# Patient Record
Sex: FEMALE | Race: BLACK OR AFRICAN AMERICAN | NOT HISPANIC OR LATINO | Employment: OTHER | URBAN - METROPOLITAN AREA
[De-identification: names, ages, dates, MRNs, and addresses within clinical notes are randomized per-mention and may not be internally consistent; named-entity substitution may affect disease eponyms.]

---

## 2022-06-24 PROCEDURE — 96360 HYDRATION IV INFUSION INIT: CPT

## 2022-06-24 PROCEDURE — 99283 EMERGENCY DEPT VISIT LOW MDM: CPT | Mod: 25

## 2022-06-24 PROCEDURE — 96361 HYDRATE IV INFUSION ADD-ON: CPT

## 2022-06-25 ENCOUNTER — HOSPITAL ENCOUNTER (EMERGENCY)
Facility: CLINIC | Age: 32
Discharge: HOME | End: 2022-06-25
Attending: EMERGENCY MEDICINE | Admitting: EMERGENCY MEDICINE
Payer: COMMERCIAL

## 2022-06-25 VITALS
TEMPERATURE: 98.4 F | SYSTOLIC BLOOD PRESSURE: 98 MMHG | DIASTOLIC BLOOD PRESSURE: 63 MMHG | OXYGEN SATURATION: 100 % | RESPIRATION RATE: 16 BRPM | HEART RATE: 78 BPM

## 2022-06-25 DIAGNOSIS — R11.2 NON-INTRACTABLE VOMITING WITH NAUSEA, UNSPECIFIED VOMITING TYPE: ICD-10-CM

## 2022-06-25 LAB
ALBUMIN SERPL-MCNC: 3.3 G/DL (ref 3.4–5)
ALBUMIN UR-MCNC: NEGATIVE MG/DL
ALP SERPL-CCNC: 46 U/L (ref 40–150)
ALT SERPL W P-5'-P-CCNC: 20 U/L (ref 0–50)
ANION GAP SERPL CALCULATED.3IONS-SCNC: 6 MMOL/L (ref 3–14)
APPEARANCE UR: ABNORMAL
AST SERPL W P-5'-P-CCNC: 10 U/L (ref 0–45)
BACTERIA #/AREA URNS HPF: ABNORMAL /HPF
BASOPHILS # BLD AUTO: 0 10E3/UL (ref 0–0.2)
BASOPHILS NFR BLD AUTO: 0 %
BILIRUB DIRECT SERPL-MCNC: 0.1 MG/DL (ref 0–0.2)
BILIRUB SERPL-MCNC: 0.4 MG/DL (ref 0.2–1.3)
BILIRUB UR QL STRIP: NEGATIVE
BUN SERPL-MCNC: 16 MG/DL (ref 7–30)
CALCIUM SERPL-MCNC: 8.8 MG/DL (ref 8.5–10.1)
CHLORIDE BLD-SCNC: 109 MMOL/L (ref 94–109)
CO2 SERPL-SCNC: 21 MMOL/L (ref 20–32)
COLOR UR AUTO: ABNORMAL
CREAT SERPL-MCNC: 0.36 MG/DL (ref 0.52–1.04)
EOSINOPHIL # BLD AUTO: 0.1 10E3/UL (ref 0–0.7)
EOSINOPHIL NFR BLD AUTO: 2 %
ERYTHROCYTE [DISTWIDTH] IN BLOOD BY AUTOMATED COUNT: 12.2 % (ref 10–15)
GFR SERPL CREATININE-BSD FRML MDRD: >90 ML/MIN/1.73M2
GLUCOSE BLD-MCNC: 97 MG/DL (ref 70–99)
GLUCOSE UR STRIP-MCNC: NEGATIVE MG/DL
HCG SERPL QL: NEGATIVE
HCT VFR BLD AUTO: 31.8 % (ref 35–47)
HGB BLD-MCNC: 10.9 G/DL (ref 11.7–15.7)
HGB UR QL STRIP: NEGATIVE
IMM GRANULOCYTES # BLD: 0 10E3/UL
IMM GRANULOCYTES NFR BLD: 0 %
KETONES UR STRIP-MCNC: NEGATIVE MG/DL
LEUKOCYTE ESTERASE UR QL STRIP: NEGATIVE
LIPASE SERPL-CCNC: 118 U/L (ref 73–393)
LYMPHOCYTES # BLD AUTO: 2 10E3/UL (ref 0.8–5.3)
LYMPHOCYTES NFR BLD AUTO: 37 %
MCH RBC QN AUTO: 29.1 PG (ref 26.5–33)
MCHC RBC AUTO-ENTMCNC: 34.3 G/DL (ref 31.5–36.5)
MCV RBC AUTO: 85 FL (ref 78–100)
MONOCYTES # BLD AUTO: 0.5 10E3/UL (ref 0–1.3)
MONOCYTES NFR BLD AUTO: 9 %
MUCOUS THREADS #/AREA URNS LPF: PRESENT /LPF
NEUTROPHILS # BLD AUTO: 2.8 10E3/UL (ref 1.6–8.3)
NEUTROPHILS NFR BLD AUTO: 52 %
NITRATE UR QL: NEGATIVE
NRBC # BLD AUTO: 0 10E3/UL
NRBC BLD AUTO-RTO: 0 /100
PH UR STRIP: 5.5 [PH] (ref 5–7)
PLATELET # BLD AUTO: 182 10E3/UL (ref 150–450)
POTASSIUM BLD-SCNC: 3.4 MMOL/L (ref 3.4–5.3)
PROT SERPL-MCNC: 7.1 G/DL (ref 6.8–8.8)
RBC # BLD AUTO: 3.75 10E6/UL (ref 3.8–5.2)
RBC URINE: <1 /HPF
SODIUM SERPL-SCNC: 136 MMOL/L (ref 133–144)
SP GR UR STRIP: 1.02 (ref 1–1.03)
SQUAMOUS EPITHELIAL: 13 /HPF
UROBILINOGEN UR STRIP-MCNC: NORMAL MG/DL
WBC # BLD AUTO: 5.5 10E3/UL (ref 4–11)
WBC URINE: 2 /HPF

## 2022-06-25 PROCEDURE — 84703 CHORIONIC GONADOTROPIN ASSAY: CPT | Performed by: EMERGENCY MEDICINE

## 2022-06-25 PROCEDURE — 82947 ASSAY GLUCOSE BLOOD QUANT: CPT | Performed by: EMERGENCY MEDICINE

## 2022-06-25 PROCEDURE — 82248 BILIRUBIN DIRECT: CPT | Performed by: EMERGENCY MEDICINE

## 2022-06-25 PROCEDURE — 258N000003 HC RX IP 258 OP 636: Performed by: EMERGENCY MEDICINE

## 2022-06-25 PROCEDURE — 36415 COLL VENOUS BLD VENIPUNCTURE: CPT | Performed by: EMERGENCY MEDICINE

## 2022-06-25 PROCEDURE — 83690 ASSAY OF LIPASE: CPT | Performed by: EMERGENCY MEDICINE

## 2022-06-25 PROCEDURE — 81003 URINALYSIS AUTO W/O SCOPE: CPT | Performed by: EMERGENCY MEDICINE

## 2022-06-25 PROCEDURE — 85014 HEMATOCRIT: CPT | Performed by: EMERGENCY MEDICINE

## 2022-06-25 PROCEDURE — 84450 TRANSFERASE (AST) (SGOT): CPT | Performed by: EMERGENCY MEDICINE

## 2022-06-25 RX ORDER — ONDANSETRON 2 MG/ML
4 INJECTION INTRAMUSCULAR; INTRAVENOUS ONCE
Status: DISCONTINUED | OUTPATIENT
Start: 2022-06-25 | End: 2022-06-25 | Stop reason: HOSPADM

## 2022-06-25 RX ORDER — ACETAMINOPHEN 500 MG
1000 TABLET ORAL ONCE
Status: DISCONTINUED | OUTPATIENT
Start: 2022-06-25 | End: 2022-06-25 | Stop reason: HOSPADM

## 2022-06-25 RX ORDER — ONDANSETRON 4 MG/1
4 TABLET, ORALLY DISINTEGRATING ORAL EVERY 8 HOURS PRN
Qty: 10 TABLET | Refills: 0 | Status: SHIPPED | OUTPATIENT
Start: 2022-06-25 | End: 2022-06-28

## 2022-06-25 RX ADMIN — SODIUM CHLORIDE 1000 ML: 9 INJECTION, SOLUTION INTRAVENOUS at 00:01

## 2022-06-25 ASSESSMENT — ENCOUNTER SYMPTOMS
NAUSEA: 1
VOMITING: 1

## 2022-06-25 NOTE — ED PROVIDER NOTES
History   Chief Complaint:  Nausea & Vomiting       HPI   Anne Alonso is a 32 year old female otherwise healthy who presents with her son for evaluation of vomiting. The patient reports that she has had nausea for the last 2 weeks. She states that she has been vomiting for the last 2 days leading to ED visit. She has been drinking fluids. She does not have a primary.    Review of Systems   Constitutional: Negative for fever.   Cardiovascular: Negative for chest pain.   Gastrointestinal: Positive for nausea and vomiting. Negative for abdominal pain and diarrhea.   All other systems reviewed and are negative.      Allergies:  The patient has no known allergies.     Medications:  The patient is currently on no regular medications.    Past Medical History:     The patient denies past medical history.     Social History:  The patient presents to the ED with her son    Physical Exam     Patient Vitals for the past 24 hrs:   BP Temp Temp src Pulse Resp SpO2   06/24/22 2336 95/62 98.4  F (36.9  C) Temporal 80 16 99 %       Physical Exam  General: Appears well-developed and well-nourished.   Head: No signs of trauma.   CV: Normal rate and regular rhythm.    Resp: Effort normal and breath sounds normal. No respiratory distress.   GI: Soft. There is no tenderness.  No rebound or guarding.  Normal bowel sounds.  No CVA tenderness.  MSK: Normal range of motion.  Neuro: The patient is alert and oriented. Speech normal.  Skin: Skin is warm and dry. No rash noted.   Psych: normal mood and affect. behavior is normal.       Emergency Department Course   Laboratory:  Labs Ordered and Resulted from Time of ED Arrival to Time of ED Departure   BASIC METABOLIC PANEL - Abnormal       Result Value    Sodium 136      Potassium 3.4      Chloride 109      Carbon Dioxide (CO2) 21      Anion Gap 6      Urea Nitrogen 16      Creatinine 0.36 (*)     Calcium 8.8      Glucose 97      GFR Estimate >90     HEPATIC FUNCTION PANEL - Abnormal     Bilirubin Total 0.4      Bilirubin Direct 0.1      Protein Total 7.1      Albumin 3.3 (*)     Alkaline Phosphatase 46      AST 10      ALT 20     ROUTINE UA WITH MICROSCOPIC REFLEX TO CULTURE - Abnormal    Color Urine Light Yellow      Appearance Urine Slightly Cloudy (*)     Glucose Urine Negative      Bilirubin Urine Negative      Ketones Urine Negative      Specific Gravity Urine 1.019      Blood Urine Negative      pH Urine 5.5      Protein Albumin Urine Negative      Urobilinogen Urine Normal      Nitrite Urine Negative      Leukocyte Esterase Urine Negative      Bacteria Urine Few (*)     Mucus Urine Present (*)     RBC Urine <1      WBC Urine 2      Squamous Epithelials Urine 13 (*)    CBC WITH PLATELETS AND DIFFERENTIAL - Abnormal    WBC Count 5.5      RBC Count 3.75 (*)     Hemoglobin 10.9 (*)     Hematocrit 31.8 (*)     MCV 85      MCH 29.1      MCHC 34.3      RDW 12.2      Platelet Count 182      % Neutrophils 52      % Lymphocytes 37      % Monocytes 9      % Eosinophils 2      % Basophils 0      % Immature Granulocytes 0      NRBCs per 100 WBC 0      Absolute Neutrophils 2.8      Absolute Lymphocytes 2.0      Absolute Monocytes 0.5      Absolute Eosinophils 0.1      Absolute Basophils 0.0      Absolute Immature Granulocytes 0.0      Absolute NRBCs 0.0     LIPASE - Normal    Lipase 118     HCG QUALITATIVE PREGNANCY - Normal    hCG Serum Qualitative Negative          Emergency Department Course:             Reviewed:  I reviewed nursing notes, vitals and past medical history    Assessments:  0245 I obtained history and examined the patient as noted above. Plan explained at this time.    Interventions:  0001 NS, 1 L, IV  Zofran, 4 mg, IV   Tylenol, 1 g, PO    Disposition:  The patient was discharged to home.     Impression & Plan   Medical Decision Making:  Anne Alonso is a 32-year-old woman presents due to nausea and vomiting.  She presented with her child as well who was also having vomiting and diarrhea  for the last couple of days.  She received IV fluids along with blood work while waiting in triage.  Blood work was reassuring.  Did give a dose of Zofran and given the reassuring work-up I felt that she was appropriate for discharge home with Zofran for continued symptom management.  She was recommended to follow-up with her doctor in clinic    Diagnosis:    ICD-10-CM    1. Non-intractable vomiting with nausea, unspecified vomiting type  R11.2 Primary Care Referral       Discharge Medications:  New Prescriptions    ONDANSETRON (ZOFRAN ODT) 4 MG ODT TAB    Take 1 tablet (4 mg) by mouth every 8 hours as needed for nausea       Scribe Disclosure:  I, Denilson Crain, am serving as a scribe at 2:40 AM on 6/25/2022 to document services personally performed by David Keith MD based on my observations and the provider's statements to me.            David Keith MD  06/26/22 0519

## 2022-06-25 NOTE — ED TRIAGE NOTES
N/v today, and the vomiting is making her fatigued.     Triage Assessment     Row Name 06/24/22 0542       Triage Assessment (Adult)    Airway WDL WDL       Respiratory WDL    Respiratory WDL WDL       Skin Circulation/Temperature WDL    Skin Circulation/Temperature WDL WDL       Cardiac WDL    Cardiac WDL WDL       Peripheral/Neurovascular WDL    Peripheral Neurovascular WDL WDL       Cognitive/Neuro/Behavioral WDL    Cognitive/Neuro/Behavioral WDL WDL

## 2022-06-26 ASSESSMENT — ENCOUNTER SYMPTOMS
ABDOMINAL PAIN: 0
DIARRHEA: 0
FEVER: 0

## 2022-07-13 PROBLEM — N90.810 FEMALE CIRCUMCISION: Status: ACTIVE | Noted: 2019-01-17

## 2022-08-02 LAB
ABO (EXTERNAL): NORMAL
HEPATITIS B SURFACE ANTIGEN (EXTERNAL): NEGATIVE
HIV1+2 AB SERPL QL IA: NONREACTIVE
RH (EXTERNAL): POSITIVE
RUBELLA ANTIBODY IGG (EXTERNAL): NORMAL
TREPONEMA PALLIDUM ANTIBODY (EXTERNAL): NONREACTIVE

## 2023-01-12 LAB — GROUP B STREPTOCOCCUS (EXTERNAL): NEGATIVE

## 2023-01-12 RX ORDER — OXYTOCIN/0.9 % SODIUM CHLORIDE 30/500 ML
340 PLASTIC BAG, INJECTION (ML) INTRAVENOUS CONTINUOUS PRN
Status: CANCELLED | OUTPATIENT
Start: 2023-01-12

## 2023-01-12 RX ORDER — TRANEXAMIC ACID 10 MG/ML
1 INJECTION, SOLUTION INTRAVENOUS EVERY 30 MIN PRN
Status: CANCELLED | OUTPATIENT
Start: 2023-01-12

## 2023-01-12 RX ORDER — CEFAZOLIN SODIUM/WATER 2 G/20 ML
2 SYRINGE (ML) INTRAVENOUS
Status: CANCELLED | OUTPATIENT
Start: 2023-01-12

## 2023-01-12 RX ORDER — MISOPROSTOL 200 UG/1
400 TABLET ORAL
Status: CANCELLED | OUTPATIENT
Start: 2023-01-12

## 2023-01-12 RX ORDER — CITRIC ACID/SODIUM CITRATE 334-500MG
30 SOLUTION, ORAL ORAL
Status: CANCELLED | OUTPATIENT
Start: 2023-01-12

## 2023-01-12 RX ORDER — OXYTOCIN/0.9 % SODIUM CHLORIDE 30/500 ML
100-340 PLASTIC BAG, INJECTION (ML) INTRAVENOUS CONTINUOUS PRN
Status: CANCELLED | OUTPATIENT
Start: 2023-01-12

## 2023-01-12 RX ORDER — ACETAMINOPHEN 325 MG/1
975 TABLET ORAL ONCE
Status: CANCELLED | OUTPATIENT
Start: 2023-01-12 | End: 2023-01-12

## 2023-01-12 RX ORDER — CEFAZOLIN SODIUM/WATER 2 G/20 ML
2 SYRINGE (ML) INTRAVENOUS SEE ADMIN INSTRUCTIONS
Status: CANCELLED | OUTPATIENT
Start: 2023-01-12

## 2023-01-12 RX ORDER — SODIUM CHLORIDE, SODIUM LACTATE, POTASSIUM CHLORIDE, CALCIUM CHLORIDE 600; 310; 30; 20 MG/100ML; MG/100ML; MG/100ML; MG/100ML
INJECTION, SOLUTION INTRAVENOUS CONTINUOUS
Status: CANCELLED | OUTPATIENT
Start: 2023-01-12

## 2023-01-12 RX ORDER — OXYTOCIN 10 [USP'U]/ML
10 INJECTION, SOLUTION INTRAMUSCULAR; INTRAVENOUS
Status: CANCELLED | OUTPATIENT
Start: 2023-01-12

## 2023-01-12 RX ORDER — METHYLERGONOVINE MALEATE 0.2 MG/ML
200 INJECTION INTRAVENOUS
Status: CANCELLED | OUTPATIENT
Start: 2023-01-12

## 2023-01-12 RX ORDER — MISOPROSTOL 200 UG/1
800 TABLET ORAL
Status: CANCELLED | OUTPATIENT
Start: 2023-01-12

## 2023-01-12 RX ORDER — CARBOPROST TROMETHAMINE 250 UG/ML
250 INJECTION, SOLUTION INTRAMUSCULAR
Status: CANCELLED | OUTPATIENT
Start: 2023-01-12

## 2023-01-12 RX ORDER — LIDOCAINE 40 MG/G
CREAM TOPICAL
Status: CANCELLED | OUTPATIENT
Start: 2023-01-12

## 2023-01-26 ENCOUNTER — TRANSFERRED RECORDS (OUTPATIENT)
Dept: HEALTH INFORMATION MANAGEMENT | Facility: CLINIC | Age: 33
End: 2023-01-26

## 2023-02-08 ENCOUNTER — LAB (OUTPATIENT)
Dept: LAB | Facility: CLINIC | Age: 33
End: 2023-02-08
Payer: COMMERCIAL

## 2023-02-08 ENCOUNTER — ANESTHESIA EVENT (OUTPATIENT)
Dept: OBGYN | Facility: CLINIC | Age: 33
End: 2023-02-08
Payer: COMMERCIAL

## 2023-02-08 LAB
ABO/RH(D): NORMAL
ANTIBODY SCREEN: NEGATIVE
HGB BLD-MCNC: 12.9 G/DL (ref 11.7–15.7)
SPECIMEN EXPIRATION DATE: NORMAL

## 2023-02-08 PROCEDURE — 86780 TREPONEMA PALLIDUM: CPT | Performed by: PHYSICIAN ASSISTANT

## 2023-02-08 PROCEDURE — 86850 RBC ANTIBODY SCREEN: CPT | Performed by: PHYSICIAN ASSISTANT

## 2023-02-08 PROCEDURE — 85018 HEMOGLOBIN: CPT | Performed by: PHYSICIAN ASSISTANT

## 2023-02-08 PROCEDURE — 36415 COLL VENOUS BLD VENIPUNCTURE: CPT | Performed by: PHYSICIAN ASSISTANT

## 2023-02-08 PROCEDURE — 86901 BLOOD TYPING SEROLOGIC RH(D): CPT | Performed by: PHYSICIAN ASSISTANT

## 2023-02-08 NOTE — ANESTHESIA PREPROCEDURE EVALUATION
Anesthesia Pre-Procedure Evaluation    Patient: Anne Alonso   MRN: [id number] : 1990        Procedure : Procedure(s):  REPEAT  SECTION          No past medical history on file.   No past surgical history on file.   No Known Allergies   Social History     Tobacco Use     Smoking status: Never     Smokeless tobacco: Not on file   Substance Use Topics     Alcohol use: Never      Wt Readings from Last 1 Encounters:   No data found for Wt        Anesthesia Evaluation   Pt has had prior anesthetic.     No history of anesthetic complications       ROS/MED HX  ENT/Pulmonary:  - neg pulmonary ROS     Neurologic:  - neg neurologic ROS     Cardiovascular:  - neg cardiovascular ROS     METS/Exercise Tolerance:     Hematologic:     (+) anemia,     Musculoskeletal:  - neg musculoskeletal ROS     GI/Hepatic:  - neg GI/hepatic ROS     Renal/Genitourinary:  - neg Renal ROS     Endo:  - neg endo ROS     Psychiatric/Substance Use:  - neg psychiatric ROS     Infectious Disease:  - neg infectious disease ROS     Malignancy:  - neg malignancy ROS     Other: Comment: Previous  times 3     (+) Possibly pregnant, ,         Physical Exam    Airway  airway exam normal      Mallampati: II   TM distance: > 3 FB   Neck ROM: full   Mouth opening: > 3 cm    Respiratory Devices and Support         Dental           Cardiovascular   cardiovascular exam normal       Rhythm and rate: regular and normal     Pulmonary           breath sounds clear to auscultation           OUTSIDE LABS:  CBC:   Lab Results   Component Value Date    WBC 5.5 2022    HGB 10.9 (L) 2022    HCT 31.8 (L) 2022     2022     BMP:   Lab Results   Component Value Date     2022    POTASSIUM 3.4 2022    CHLORIDE 109 2022    CO2 21 2022    BUN 16 2022    CR 0.36 (L) 2022    GLC 97 2022     COAGS: No results found for: PTT, INR, FIBR  POC:   Lab Results   Component Value Date    HCGS  Negative 2022     HEPATIC:   Lab Results   Component Value Date    ALBUMIN 3.3 (L) 2022    PROTTOTAL 7.1 2022    ALT 20 2022    AST 10 2022    ALKPHOS 46 2022    BILITOTAL 0.4 2022     OTHER:   Lab Results   Component Value Date    STANLEY 8.8 2022    LIPASE 118 2022       Anesthesia Plan    ASA Status:  2      Anesthesia Type: Spinal (GETA backup.).              Consents    Anesthesia Plan(s) and associated risks, benefits, and realistic alternatives discussed. Questions answered and patient/representative(s) expressed understanding.    - Discussed:     - Discussed with:  Patient         Postoperative Care    Pain management: intrathecal morphine.   PONV prophylaxis: Ondansetron (or other 5HT-3), Dexamethasone or Solumedrol     Comments:    Other Comments: Patient felt pressure in previous . I explained that pressure is normal, but anything sharp or painful is not. The  will be in the OR to help with communication.            Marcello Barros MD

## 2023-02-09 ENCOUNTER — HOSPITAL ENCOUNTER (INPATIENT)
Facility: CLINIC | Age: 33
LOS: 3 days | Discharge: HOME | End: 2023-02-12
Attending: OBSTETRICS & GYNECOLOGY | Admitting: OBSTETRICS & GYNECOLOGY
Payer: COMMERCIAL

## 2023-02-09 ENCOUNTER — ANESTHESIA (OUTPATIENT)
Dept: OBGYN | Facility: CLINIC | Age: 33
End: 2023-02-09
Payer: COMMERCIAL

## 2023-02-09 DIAGNOSIS — Z98.891 H/O CESAREAN SECTION: ICD-10-CM

## 2023-02-09 DIAGNOSIS — N90.810 FEMALE CIRCUMCISION: Primary | ICD-10-CM

## 2023-02-09 LAB — T PALLIDUM AB SER QL: NONREACTIVE

## 2023-02-09 PROCEDURE — 250N000011 HC RX IP 250 OP 636: Performed by: ANESTHESIOLOGY

## 2023-02-09 PROCEDURE — 370N000017 HC ANESTHESIA TECHNICAL FEE, PER MIN: Performed by: OBSTETRICS & GYNECOLOGY

## 2023-02-09 PROCEDURE — 250N000013 HC RX MED GY IP 250 OP 250 PS 637: Performed by: OBSTETRICS & GYNECOLOGY

## 2023-02-09 PROCEDURE — 999N000016 HC STATISTIC ATTENDANCE AT DELIVERY

## 2023-02-09 PROCEDURE — 360N000076 HC SURGERY LEVEL 3, PER MIN: Performed by: OBSTETRICS & GYNECOLOGY

## 2023-02-09 PROCEDURE — 272N000001 HC OR GENERAL SUPPLY STERILE: Performed by: OBSTETRICS & GYNECOLOGY

## 2023-02-09 PROCEDURE — 250N000011 HC RX IP 250 OP 636: Performed by: OBSTETRICS & GYNECOLOGY

## 2023-02-09 PROCEDURE — 250N000009 HC RX 250: Performed by: NURSE ANESTHETIST, CERTIFIED REGISTERED

## 2023-02-09 PROCEDURE — 120N000012 HC R&B POSTPARTUM

## 2023-02-09 PROCEDURE — 258N000003 HC RX IP 258 OP 636: Performed by: PHYSICIAN ASSISTANT

## 2023-02-09 PROCEDURE — 258N000003 HC RX IP 258 OP 636: Performed by: NURSE ANESTHETIST, CERTIFIED REGISTERED

## 2023-02-09 PROCEDURE — 250N000013 HC RX MED GY IP 250 OP 250 PS 637

## 2023-02-09 PROCEDURE — 250N000011 HC RX IP 250 OP 636: Performed by: NURSE ANESTHETIST, CERTIFIED REGISTERED

## 2023-02-09 PROCEDURE — 710N000009 HC RECOVERY PHASE 1, LEVEL 1, PER MIN: Performed by: OBSTETRICS & GYNECOLOGY

## 2023-02-09 PROCEDURE — 250N000011 HC RX IP 250 OP 636: Performed by: PHYSICIAN ASSISTANT

## 2023-02-09 PROCEDURE — 10907ZC DRAINAGE OF AMNIOTIC FLUID, THERAPEUTIC FROM PRODUCTS OF CONCEPTION, VIA NATURAL OR ARTIFICIAL OPENING: ICD-10-PCS | Performed by: OBSTETRICS & GYNECOLOGY

## 2023-02-09 RX ORDER — MISOPROSTOL 200 UG/1
800 TABLET ORAL
Status: DISCONTINUED | OUTPATIENT
Start: 2023-02-09 | End: 2023-02-09

## 2023-02-09 RX ORDER — CITRIC ACID/SODIUM CITRATE 334-500MG
SOLUTION, ORAL ORAL
Status: COMPLETED
Start: 2023-02-09 | End: 2023-02-09

## 2023-02-09 RX ORDER — MISOPROSTOL 200 UG/1
800 TABLET ORAL
Status: DISCONTINUED | OUTPATIENT
Start: 2023-02-09 | End: 2023-02-12 | Stop reason: HOSPADM

## 2023-02-09 RX ORDER — CARBOPROST TROMETHAMINE 250 UG/ML
250 INJECTION, SOLUTION INTRAMUSCULAR
Status: DISCONTINUED | OUTPATIENT
Start: 2023-02-09 | End: 2023-02-10

## 2023-02-09 RX ORDER — CEFAZOLIN SODIUM/WATER 2 G/20 ML
SYRINGE (ML) INTRAVENOUS
Status: DISCONTINUED
Start: 2023-02-09 | End: 2023-02-09 | Stop reason: HOSPADM

## 2023-02-09 RX ORDER — BUPIVACAINE HYDROCHLORIDE 7.5 MG/ML
INJECTION, SOLUTION INTRASPINAL
Status: COMPLETED | OUTPATIENT
Start: 2023-02-09 | End: 2023-02-09

## 2023-02-09 RX ORDER — OXYTOCIN/0.9 % SODIUM CHLORIDE 30/500 ML
340 PLASTIC BAG, INJECTION (ML) INTRAVENOUS CONTINUOUS PRN
Status: DISCONTINUED | OUTPATIENT
Start: 2023-02-09 | End: 2023-02-12 | Stop reason: HOSPADM

## 2023-02-09 RX ORDER — ONDANSETRON 4 MG/1
4 TABLET, ORALLY DISINTEGRATING ORAL EVERY 6 HOURS PRN
Status: DISCONTINUED | OUTPATIENT
Start: 2023-02-09 | End: 2023-02-12 | Stop reason: HOSPADM

## 2023-02-09 RX ORDER — CITRIC ACID/SODIUM CITRATE 334-500MG
30 SOLUTION, ORAL ORAL
Status: COMPLETED | OUTPATIENT
Start: 2023-02-09 | End: 2023-02-09

## 2023-02-09 RX ORDER — ACETAMINOPHEN 325 MG/1
975 TABLET ORAL EVERY 6 HOURS
Status: DISCONTINUED | OUTPATIENT
Start: 2023-02-09 | End: 2023-02-12 | Stop reason: HOSPADM

## 2023-02-09 RX ORDER — NALOXONE HYDROCHLORIDE 0.4 MG/ML
0.4 INJECTION, SOLUTION INTRAMUSCULAR; INTRAVENOUS; SUBCUTANEOUS
Status: DISCONTINUED | OUTPATIENT
Start: 2023-02-09 | End: 2023-02-12 | Stop reason: HOSPADM

## 2023-02-09 RX ORDER — HYDROMORPHONE HCL IN WATER/PF 6 MG/30 ML
0.2 PATIENT CONTROLLED ANALGESIA SYRINGE INTRAVENOUS EVERY 5 MIN PRN
Status: DISCONTINUED | OUTPATIENT
Start: 2023-02-09 | End: 2023-02-09 | Stop reason: HOSPADM

## 2023-02-09 RX ORDER — MISOPROSTOL 200 UG/1
400 TABLET ORAL
Status: DISCONTINUED | OUTPATIENT
Start: 2023-02-09 | End: 2023-02-09

## 2023-02-09 RX ORDER — MISOPROSTOL 200 UG/1
400 TABLET ORAL
Status: DISCONTINUED | OUTPATIENT
Start: 2023-02-09 | End: 2023-02-12 | Stop reason: HOSPADM

## 2023-02-09 RX ORDER — SIMETHICONE 80 MG
80 TABLET,CHEWABLE ORAL 4 TIMES DAILY PRN
Status: DISCONTINUED | OUTPATIENT
Start: 2023-02-09 | End: 2023-02-12 | Stop reason: HOSPADM

## 2023-02-09 RX ORDER — OXYTOCIN 10 [USP'U]/ML
10 INJECTION, SOLUTION INTRAMUSCULAR; INTRAVENOUS
Status: DISCONTINUED | OUTPATIENT
Start: 2023-02-09 | End: 2023-02-09

## 2023-02-09 RX ORDER — OXYTOCIN/0.9 % SODIUM CHLORIDE 30/500 ML
340 PLASTIC BAG, INJECTION (ML) INTRAVENOUS CONTINUOUS PRN
Status: DISCONTINUED | OUTPATIENT
Start: 2023-02-09 | End: 2023-02-09

## 2023-02-09 RX ORDER — HYDROMORPHONE HCL IN WATER/PF 6 MG/30 ML
0.4 PATIENT CONTROLLED ANALGESIA SYRINGE INTRAVENOUS EVERY 5 MIN PRN
Status: DISCONTINUED | OUTPATIENT
Start: 2023-02-09 | End: 2023-02-09 | Stop reason: HOSPADM

## 2023-02-09 RX ORDER — FENTANYL CITRATE 50 UG/ML
50 INJECTION, SOLUTION INTRAMUSCULAR; INTRAVENOUS EVERY 5 MIN PRN
Status: DISCONTINUED | OUTPATIENT
Start: 2023-02-09 | End: 2023-02-09 | Stop reason: HOSPADM

## 2023-02-09 RX ORDER — AMOXICILLIN 250 MG
1 CAPSULE ORAL 2 TIMES DAILY
Status: DISCONTINUED | OUTPATIENT
Start: 2023-02-09 | End: 2023-02-12 | Stop reason: HOSPADM

## 2023-02-09 RX ORDER — OXYTOCIN 10 [USP'U]/ML
10 INJECTION, SOLUTION INTRAMUSCULAR; INTRAVENOUS
Status: DISCONTINUED | OUTPATIENT
Start: 2023-02-09 | End: 2023-02-12 | Stop reason: HOSPADM

## 2023-02-09 RX ORDER — CEFAZOLIN SODIUM/WATER 2 G/20 ML
2 SYRINGE (ML) INTRAVENOUS SEE ADMIN INSTRUCTIONS
Status: DISCONTINUED | OUTPATIENT
Start: 2023-02-09 | End: 2023-02-09

## 2023-02-09 RX ORDER — PROCHLORPERAZINE 25 MG
25 SUPPOSITORY, RECTAL RECTAL EVERY 12 HOURS PRN
Status: DISCONTINUED | OUTPATIENT
Start: 2023-02-09 | End: 2023-02-12 | Stop reason: HOSPADM

## 2023-02-09 RX ORDER — ONDANSETRON 4 MG/1
4 TABLET, ORALLY DISINTEGRATING ORAL EVERY 6 HOURS PRN
Status: DISCONTINUED | OUTPATIENT
Start: 2023-02-09 | End: 2023-02-10

## 2023-02-09 RX ORDER — CARBOPROST TROMETHAMINE 250 UG/ML
250 INJECTION, SOLUTION INTRAMUSCULAR
Status: DISCONTINUED | OUTPATIENT
Start: 2023-02-09 | End: 2023-02-12 | Stop reason: HOSPADM

## 2023-02-09 RX ORDER — METHYLERGONOVINE MALEATE 0.2 MG/ML
200 INJECTION INTRAVENOUS
Status: DISCONTINUED | OUTPATIENT
Start: 2023-02-09 | End: 2023-02-12 | Stop reason: HOSPADM

## 2023-02-09 RX ORDER — METHYLERGONOVINE MALEATE 0.2 MG/ML
200 INJECTION INTRAVENOUS
Status: DISCONTINUED | OUTPATIENT
Start: 2023-02-09 | End: 2023-02-09

## 2023-02-09 RX ORDER — CEFAZOLIN SODIUM/WATER 2 G/20 ML
2 SYRINGE (ML) INTRAVENOUS
Status: COMPLETED | OUTPATIENT
Start: 2023-02-09 | End: 2023-02-09

## 2023-02-09 RX ORDER — OXYTOCIN/0.9 % SODIUM CHLORIDE 30/500 ML
PLASTIC BAG, INJECTION (ML) INTRAVENOUS CONTINUOUS PRN
Status: DISCONTINUED | OUTPATIENT
Start: 2023-02-09 | End: 2023-02-09

## 2023-02-09 RX ORDER — ONDANSETRON 2 MG/ML
4 INJECTION INTRAMUSCULAR; INTRAVENOUS EVERY 6 HOURS PRN
Status: DISCONTINUED | OUTPATIENT
Start: 2023-02-09 | End: 2023-02-10

## 2023-02-09 RX ORDER — PROCHLORPERAZINE MALEATE 10 MG
10 TABLET ORAL EVERY 6 HOURS PRN
Status: DISCONTINUED | OUTPATIENT
Start: 2023-02-09 | End: 2023-02-12 | Stop reason: HOSPADM

## 2023-02-09 RX ORDER — BISACODYL 10 MG
10 SUPPOSITORY, RECTAL RECTAL DAILY PRN
Status: DISCONTINUED | OUTPATIENT
Start: 2023-02-11 | End: 2023-02-12 | Stop reason: HOSPADM

## 2023-02-09 RX ORDER — DEXTROSE, SODIUM CHLORIDE, SODIUM LACTATE, POTASSIUM CHLORIDE, AND CALCIUM CHLORIDE 5; .6; .31; .03; .02 G/100ML; G/100ML; G/100ML; G/100ML; G/100ML
INJECTION, SOLUTION INTRAVENOUS CONTINUOUS
Status: DISCONTINUED | OUTPATIENT
Start: 2023-02-09 | End: 2023-02-12 | Stop reason: HOSPADM

## 2023-02-09 RX ORDER — HYDROCORTISONE 25 MG/G
CREAM TOPICAL 3 TIMES DAILY PRN
Status: DISCONTINUED | OUTPATIENT
Start: 2023-02-09 | End: 2023-02-12 | Stop reason: HOSPADM

## 2023-02-09 RX ORDER — NALOXONE HYDROCHLORIDE 0.4 MG/ML
0.2 INJECTION, SOLUTION INTRAMUSCULAR; INTRAVENOUS; SUBCUTANEOUS
Status: DISCONTINUED | OUTPATIENT
Start: 2023-02-09 | End: 2023-02-12 | Stop reason: HOSPADM

## 2023-02-09 RX ORDER — NALBUPHINE HYDROCHLORIDE 10 MG/ML
2.5-5 INJECTION, SOLUTION INTRAMUSCULAR; INTRAVENOUS; SUBCUTANEOUS EVERY 6 HOURS PRN
Status: DISCONTINUED | OUTPATIENT
Start: 2023-02-09 | End: 2023-02-10

## 2023-02-09 RX ORDER — ACETAMINOPHEN 325 MG/1
975 TABLET ORAL ONCE
Status: COMPLETED | OUTPATIENT
Start: 2023-02-09 | End: 2023-02-09

## 2023-02-09 RX ORDER — IBUPROFEN 400 MG/1
800 TABLET, FILM COATED ORAL EVERY 6 HOURS
Status: DISCONTINUED | OUTPATIENT
Start: 2023-02-10 | End: 2023-02-12 | Stop reason: HOSPADM

## 2023-02-09 RX ORDER — FENTANYL CITRATE 50 UG/ML
25 INJECTION, SOLUTION INTRAMUSCULAR; INTRAVENOUS EVERY 5 MIN PRN
Status: DISCONTINUED | OUTPATIENT
Start: 2023-02-09 | End: 2023-02-09 | Stop reason: HOSPADM

## 2023-02-09 RX ORDER — LIDOCAINE 40 MG/G
CREAM TOPICAL
Status: DISCONTINUED | OUTPATIENT
Start: 2023-02-09 | End: 2023-02-09

## 2023-02-09 RX ORDER — MODIFIED LANOLIN
OINTMENT (GRAM) TOPICAL
Status: DISCONTINUED | OUTPATIENT
Start: 2023-02-09 | End: 2023-02-12 | Stop reason: HOSPADM

## 2023-02-09 RX ORDER — OXYCODONE HYDROCHLORIDE 5 MG/1
TABLET ORAL
Status: DISCONTINUED
Start: 2023-02-09 | End: 2023-02-09 | Stop reason: WASHOUT

## 2023-02-09 RX ORDER — AMOXICILLIN 250 MG
2 CAPSULE ORAL 2 TIMES DAILY
Status: DISCONTINUED | OUTPATIENT
Start: 2023-02-09 | End: 2023-02-12 | Stop reason: HOSPADM

## 2023-02-09 RX ORDER — OXYTOCIN/0.9 % SODIUM CHLORIDE 30/500 ML
100-340 PLASTIC BAG, INJECTION (ML) INTRAVENOUS CONTINUOUS PRN
Status: DISCONTINUED | OUTPATIENT
Start: 2023-02-09 | End: 2023-02-09

## 2023-02-09 RX ORDER — SODIUM CHLORIDE, SODIUM LACTATE, POTASSIUM CHLORIDE, CALCIUM CHLORIDE 600; 310; 30; 20 MG/100ML; MG/100ML; MG/100ML; MG/100ML
INJECTION, SOLUTION INTRAVENOUS CONTINUOUS
Status: DISCONTINUED | OUTPATIENT
Start: 2023-02-09 | End: 2023-02-09

## 2023-02-09 RX ORDER — METOCLOPRAMIDE 10 MG/1
10 TABLET ORAL EVERY 6 HOURS PRN
Status: DISCONTINUED | OUTPATIENT
Start: 2023-02-09 | End: 2023-02-12 | Stop reason: HOSPADM

## 2023-02-09 RX ORDER — KETOROLAC TROMETHAMINE 30 MG/ML
INJECTION, SOLUTION INTRAMUSCULAR; INTRAVENOUS PRN
Status: DISCONTINUED | OUTPATIENT
Start: 2023-02-09 | End: 2023-02-09

## 2023-02-09 RX ORDER — METOCLOPRAMIDE HYDROCHLORIDE 5 MG/ML
10 INJECTION INTRAMUSCULAR; INTRAVENOUS EVERY 6 HOURS PRN
Status: DISCONTINUED | OUTPATIENT
Start: 2023-02-09 | End: 2023-02-12 | Stop reason: HOSPADM

## 2023-02-09 RX ORDER — SODIUM CHLORIDE, SODIUM LACTATE, POTASSIUM CHLORIDE, CALCIUM CHLORIDE 600; 310; 30; 20 MG/100ML; MG/100ML; MG/100ML; MG/100ML
INJECTION, SOLUTION INTRAVENOUS CONTINUOUS
Status: DISCONTINUED | OUTPATIENT
Start: 2023-02-09 | End: 2023-02-09 | Stop reason: HOSPADM

## 2023-02-09 RX ORDER — ONDANSETRON 2 MG/ML
4 INJECTION INTRAMUSCULAR; INTRAVENOUS EVERY 6 HOURS PRN
Status: DISCONTINUED | OUTPATIENT
Start: 2023-02-09 | End: 2023-02-12 | Stop reason: HOSPADM

## 2023-02-09 RX ORDER — MORPHINE SULFATE 1 MG/ML
INJECTION, SOLUTION EPIDURAL; INTRATHECAL; INTRAVENOUS
Status: COMPLETED | OUTPATIENT
Start: 2023-02-09 | End: 2023-02-09

## 2023-02-09 RX ORDER — TRANEXAMIC ACID 10 MG/ML
1 INJECTION, SOLUTION INTRAVENOUS EVERY 30 MIN PRN
Status: DISCONTINUED | OUTPATIENT
Start: 2023-02-09 | End: 2023-02-12 | Stop reason: HOSPADM

## 2023-02-09 RX ORDER — ONDANSETRON 2 MG/ML
INJECTION INTRAMUSCULAR; INTRAVENOUS PRN
Status: DISCONTINUED | OUTPATIENT
Start: 2023-02-09 | End: 2023-02-09

## 2023-02-09 RX ORDER — ONDANSETRON 4 MG/1
4 TABLET, ORALLY DISINTEGRATING ORAL EVERY 30 MIN PRN
Status: DISCONTINUED | OUTPATIENT
Start: 2023-02-09 | End: 2023-02-09 | Stop reason: HOSPADM

## 2023-02-09 RX ORDER — OXYCODONE HYDROCHLORIDE 5 MG/1
5 TABLET ORAL EVERY 4 HOURS PRN
Status: DISCONTINUED | OUTPATIENT
Start: 2023-02-09 | End: 2023-02-12 | Stop reason: HOSPADM

## 2023-02-09 RX ORDER — TRANEXAMIC ACID 10 MG/ML
1 INJECTION, SOLUTION INTRAVENOUS EVERY 30 MIN PRN
Status: DISCONTINUED | OUTPATIENT
Start: 2023-02-09 | End: 2023-02-09

## 2023-02-09 RX ORDER — KETOROLAC TROMETHAMINE 30 MG/ML
30 INJECTION, SOLUTION INTRAMUSCULAR; INTRAVENOUS EVERY 6 HOURS
Status: COMPLETED | OUTPATIENT
Start: 2023-02-09 | End: 2023-02-10

## 2023-02-09 RX ORDER — ONDANSETRON 2 MG/ML
4 INJECTION INTRAMUSCULAR; INTRAVENOUS EVERY 30 MIN PRN
Status: DISCONTINUED | OUTPATIENT
Start: 2023-02-09 | End: 2023-02-09 | Stop reason: HOSPADM

## 2023-02-09 RX ORDER — ACETAMINOPHEN 325 MG/1
TABLET ORAL
Status: COMPLETED
Start: 2023-02-09 | End: 2023-02-09

## 2023-02-09 RX ORDER — LIDOCAINE 40 MG/G
CREAM TOPICAL
Status: DISCONTINUED | OUTPATIENT
Start: 2023-02-09 | End: 2023-02-12 | Stop reason: HOSPADM

## 2023-02-09 RX ADMIN — OXYCODONE HYDROCHLORIDE 5 MG: 5 TABLET ORAL at 10:47

## 2023-02-09 RX ADMIN — SENNOSIDES AND DOCUSATE SODIUM 2 TABLET: 50; 8.6 TABLET ORAL at 10:47

## 2023-02-09 RX ADMIN — NALBUPHINE HYDROCHLORIDE 2.5 MG: 10 INJECTION, SOLUTION INTRAMUSCULAR; INTRAVENOUS; SUBCUTANEOUS at 14:25

## 2023-02-09 RX ADMIN — SODIUM CHLORIDE, SODIUM LACTATE, POTASSIUM CHLORIDE, CALCIUM CHLORIDE AND DEXTROSE MONOHYDRATE: 5; 600; 310; 30; 20 INJECTION, SOLUTION INTRAVENOUS at 12:17

## 2023-02-09 RX ADMIN — ONDANSETRON 4 MG: 2 INJECTION INTRAMUSCULAR; INTRAVENOUS at 07:48

## 2023-02-09 RX ADMIN — KETOROLAC TROMETHAMINE 30 MG: 30 INJECTION, SOLUTION INTRAMUSCULAR at 07:57

## 2023-02-09 RX ADMIN — SODIUM CHLORIDE, POTASSIUM CHLORIDE, SODIUM LACTATE AND CALCIUM CHLORIDE: 600; 310; 30; 20 INJECTION, SOLUTION INTRAVENOUS at 07:41

## 2023-02-09 RX ADMIN — SENNOSIDES AND DOCUSATE SODIUM 1 TABLET: 50; 8.6 TABLET ORAL at 20:25

## 2023-02-09 RX ADMIN — MORPHINE SULFATE 0.15 MG: 1 INJECTION, SOLUTION EPIDURAL; INTRATHECAL; INTRAVENOUS at 07:34

## 2023-02-09 RX ADMIN — ACETAMINOPHEN 975 MG: 325 TABLET, FILM COATED ORAL at 12:14

## 2023-02-09 RX ADMIN — ACETAMINOPHEN 975 MG: 325 TABLET ORAL at 06:32

## 2023-02-09 RX ADMIN — Medication 2 G: at 07:24

## 2023-02-09 RX ADMIN — SODIUM CHLORIDE, POTASSIUM CHLORIDE, SODIUM LACTATE AND CALCIUM CHLORIDE: 600; 310; 30; 20 INJECTION, SOLUTION INTRAVENOUS at 06:15

## 2023-02-09 RX ADMIN — BUPIVACAINE HYDROCHLORIDE IN DEXTROSE 1.6 ML: 7.5 INJECTION, SOLUTION SUBARACHNOID at 07:34

## 2023-02-09 RX ADMIN — SODIUM CHLORIDE, SODIUM LACTATE, POTASSIUM CHLORIDE, CALCIUM CHLORIDE AND DEXTROSE MONOHYDRATE: 5; 600; 310; 30; 20 INJECTION, SOLUTION INTRAVENOUS at 20:25

## 2023-02-09 RX ADMIN — KETOROLAC TROMETHAMINE 30 MG: 30 INJECTION, SOLUTION INTRAMUSCULAR; INTRAVENOUS at 14:17

## 2023-02-09 RX ADMIN — Medication 340 ML/HR: at 07:46

## 2023-02-09 RX ADMIN — Medication 30 ML: at 07:06

## 2023-02-09 RX ADMIN — SODIUM CITRATE AND CITRIC ACID MONOHYDRATE 30 ML: 500; 334 SOLUTION ORAL at 07:06

## 2023-02-09 RX ADMIN — ACETAMINOPHEN 975 MG: 325 TABLET, FILM COATED ORAL at 18:30

## 2023-02-09 RX ADMIN — KETOROLAC TROMETHAMINE 30 MG: 30 INJECTION, SOLUTION INTRAMUSCULAR; INTRAVENOUS at 20:25

## 2023-02-09 RX ADMIN — PHENYLEPHRINE HYDROCHLORIDE 0.5 MCG/KG/MIN: 10 INJECTION INTRAVENOUS at 07:33

## 2023-02-09 RX ADMIN — ACETAMINOPHEN 975 MG: 325 TABLET, FILM COATED ORAL at 06:32

## 2023-02-09 ASSESSMENT — ACTIVITIES OF DAILY LIVING (ADL)
ADLS_ACUITY_SCORE: 18
ADLS_ACUITY_SCORE: 18
ADLS_ACUITY_SCORE: 19
ADLS_ACUITY_SCORE: 18

## 2023-02-09 NOTE — PLAN OF CARE
Data: Anne Alonso transferred to [facility] room 404 via cart at 1020. Baby transferred via parent's arms.  Action: Receiving unit notified of transfer: Yes. Patient and family notified of room change. Report given to SAMANTHA Church RN at 1025. Belongings sent to receiving unit. Accompanied by Registered Nurse. Oriented patient to surroundings. Call light within reach. ID bands double-checked with receiving RN.  Response: Patient tolerated transfer and is stable.

## 2023-02-09 NOTE — PLAN OF CARE
Pt. admitted from L&D via bed.. Pt. arrived with baby and was accompanied by spouse and arrived with personal belongings. Report was taken from Dejah SHEFFIELD RN  in L&D.  Pt. is A&Ox3 and VSS on RA. Fundus is firm and midline.  Vaginal bleeding is scant.   Pt. c/o 5/10 pain. Pt. denied  nausea, CP, SOB, lightheadedness, or dizziness. LS  and BS . PIV patent and infusing.  Jarrell patent and draining.  Dressing to lower abdomen CDI.  Pneumoboots in place to BLE.  Pt. oriented to the room and call light system.

## 2023-02-09 NOTE — LACTATION NOTE
"Routine Lactation visit with Anne & baby boy. Of note, baby is having additional testing performed for potential trisomy 21. Anne planning to both breastfeed and bottle feed. Encouraged to work on breastfeeding first as baby is able, prior to bottle feeding. At time of visit, Anne attempting to get baby latched at right breast, he was alert and eager but not latching easily. Anne has large breasts, with large nipples that do honey easily. Anne shared she  her other children but also supplemented with formula with all of them.    She declined getting baby skin to skin to feed at time of visit. Assisted with positioning baby at breast and attempted several times to get him to latch. Noted he would tend to push the nipple out when attempting to suck. Anne stated, \"I think my nipples are too large for him\". Checked suck with gloved finger and noted he was tending to push finger out with relatively uncoordinated suck. Discussed trying a nipple shield since baby isn't easily able to latch. Anne consented to try.    While LC retrieved a nipple shield, baby able to latch and sustain latch, so nipple shield was not started at this feeding. Encouraged keeping nipple shield as an option if baby isn't able to latch deeply or if he continues to tongue thrust with feedings.     Reviewed timeline to mature milk over first 3-5 days. Encouraged to breastfeed first prior to supplementing. Encouraged to review Breastfeeding section in Your Guide to Postpartum &  Care. Discussed typical  feeding patterns, cluster feeding, and ways to wake a sleepy baby for feedings.    Feeding plan: Recommend unlimited, frequent breast feedings: At least 8 - 12 times every 24 hours. If supplementing, breastfeed first prior to supplementing. Encouraged use of feeding log and to record feedings, and void/stool patterns. Anne appreciative of visit and Lactation support.    Gayla Briseno, RN-C, IBCLC, MNN, PHN, BSN    "

## 2023-02-09 NOTE — ANESTHESIA POSTPROCEDURE EVALUATION
Patient: Anne Alonso    Procedure: Procedure(s):  REPEAT  SECTION       Anesthesia Type:  Spinal    Note:  Disposition: Admission   Postop Pain Control: Uneventful            Sign Out: Well controlled pain   PONV: No   Neuro/Psych: Uneventful            Sign Out: Acceptable/Baseline neuro status   Airway/Respiratory: Uneventful            Sign Out: Acceptable/Baseline resp. status   CV/Hemodynamics: Uneventful            Sign Out: Acceptable CV status; No obvious hypovolemia; No obvious fluid overload   Other NRE: NONE   DID A NON-ROUTINE EVENT OCCUR? No    Event details/Postop Comments:  Patient was comfortable for the procedure.           Last vitals:  Vitals Value Taken Time   /66 23 0855   Temp 36.7  C (98.1  F) 23 0810   Pulse 71 23 0858   Resp 34 23 0858   SpO2 98 % 23 0858   Vitals shown include unvalidated device data.    Electronically Signed By: Marcello Barros MD  2023  9:00 AM

## 2023-02-09 NOTE — ANESTHESIA CARE TRANSFER NOTE
Patient: Anne Alonso    Procedure: Procedure(s):  REPEAT  SECTION       Diagnosis: Previous  section [Z98.891]  Diagnosis Additional Information: No value filed.    Anesthesia Type:   Spinal     Note:    Oropharynx: oropharynx clear of all foreign objects  Level of Consciousness: awake  Oxygen Supplementation: room air    Independent Airway: airway patency satisfactory and stable  Dentition: dentition unchanged      Patient transferred to: PACU  Comments: Transferred to OB PACU recovery, spontaneous respirations on room air with oxygen saturations maintained greater than 98%. SpO2, NiBP, and EKG monitors and alarms on and functioning, RN questions answered, patient in hospital cart with siderails up Oxytocin 30 units in 500mL infusion connected to IV infusion pump in recovery bay and programmed to 340 mL/hr at handoff of care.    Handoff Report: Identifed the Patient, Identified the Reponsible Provider, Reviewed the pertinent medical history, Discussed the surgical course, Reviewed Intra-OP anesthesia mangement and issues during anesthesia, Set expectations for post-procedure period and Allowed opportunity for questions and acknowledgement of understanding      Vitals:  Vitals Value Taken Time   BP     Temp     Pulse     Resp     SpO2         Electronically Signed By: SALAZAR Erazo CRNA  2023  8:12 AM

## 2023-02-09 NOTE — BRIEF OP NOTE
[facility]  Brief Operative Note    Pre-operative diagnosis: Previous  section x 3 [Z98.891]   Post-operative diagnosis Same   Procedure: Procedure(s):  REPEAT  SECTION, 4th   Surgeon: Pauline Wilde MD   Assistants(s): Tenisha Penaloza NP   Anesthesia: Spinal    Estimated blood loss: 310 cc    Specimens: None   Findings: Male with thick meconium. JULIET presentation. Truncal cord x 1.    Complications: None   Comments: See dictated operative report for full details       Pauline Wilde MD

## 2023-02-09 NOTE — PLAN OF CARE
Pt arrives at 0545 for scheduled 0715 c section. She arrives ambulatory with spouse. An  is also present and scheduled for her care. Pt is  with this being her 4th c section. She reports +fetal movement and denies LOF/VB. Oriented to room and allowed to change. After obtaining verbal consent, monitors applied. Triage/intake assessment completed. Discussed POC for pre op cares with patient and sig other. They deny having questions or needs, all interactions performed with .

## 2023-02-09 NOTE — ANESTHESIA PROCEDURE NOTES
"Intrathecal injection Procedure Note    Pre-Procedure   Staff -        Anesthesiologist:  Marcello Barros MD       Performed By: Anesthesiologist       Location: OR       Procedure Start/Stop Times: 2/9/2023 7:30 AM and 2/9/2023 7:34 AM       Pre-Anesthestic Checklist: patient identified, IV checked, risks and benefits discussed, informed consent, monitors and equipment checked, pre-op evaluation and at physician/surgeon's request  Timeout:       Correct Patient: Yes        Correct Procedure: Yes        Correct Site: Yes        Correct Position: Yes   Procedure Documentation  Procedure: intrathecal injection       Patient Position: sitting       Patient Prep/Sterile Barriers: sterile gloves, mask, patient draped       Skin prep: Chloraprep       Insertion Site: L3-4. (midline approach).       Needle Gauge: 25.        Needle Length (Inches): 3.5        Spinal Needle Type: Pencan       Introducer used       Introducer: 20 G       # of attempts: 1 and  # of redirects:  0    Assessment/Narrative         Paresthesias: No.       CSF fluid: clear.    Medication(s) Administered   0.75% Hyperbaric Bupivacaine (Intrathecal) - Intrathecal   1.6 mL - 2/9/2023 7:34:00 AM  Morphine PF 1 mg/mL (Intrathecal) - Intrathecal   0.15 mg - 2/9/2023 7:34:00 AM  Medication Administration Time: 2/9/2023 7:30 AM     Comments:  Pt immediately to supine with CASEY.  Phenylephrine infusion immediately begun.    FHTs stable post-procedure.   Pt tolerated well.   No complications.       FOR [facility] ([facility]) ONLY:   Pain Team Contact information: please page the Pain Team Via [employer] Search \"Pain\". During daytime hours, please page the attending first. At night please page the resident first.    "

## 2023-02-09 NOTE — OP NOTE
Procedure Date: 2023    PREOPERATIVE DIAGNOSIS:  Prior  section x3.    POSTOPERATIVE DIAGNOSES:  Prior  section x3.    PROCEDURE:  Repeat low transverse  section.    SURGEON:  Pauline Wilde M.D.    ASSISTANT:  Maribell Penaloza NP, necessary for visualization and hemostasis.    ANESTHESIA:  Spinal anesthesia.    ESTIMATED BLOOD LOSS:  310 mL    SPECIMENS:  Cord blood and cord segment.    COMPLICATIONS:  Thick meconium and truncal cord x1.    INDICATIONS FOR PROCEDURE:  The patient is a 32-year-old female with an uncomplicated pregnancy.  She desired a repeat  section.  She has had three prior  sections, and risks, benefits and alternatives were discussed, the morning of the procedure and again prior in the clinic.  She did sign informed consent with a [facility]  present.    DESCRIPTION OF PROCEDURE:  The patient was brought to the operating room where spinal anesthetic was placed.  She was prepped and draped in normal sterile fashion.  A pause for the cause was performed.  The patient and procedure were correctly identified.  At that point, a low transverse skin incision was made.  It was carried down to the underlying fascial layer with Bovie electrocautery, which was scored and stretched bilaterally.  The peritoneal cavity was bluntly entered and a bladder blade was inserted.  Hysterotomy was begun and there was a bulging bag of thick meconium noted.  The NICU was called and an amniotomy was performed.  The fetal vertex delivered atraumatically with truncal cord x1 that was reduced by delivering through.  A delayed cord clamp of 1 minute was performed with squalling infant and suctioning of the nares and oropharynx.  The cord was then clamped by myself and cut, and the infant was brought over to the NICU staff.    The placenta delivered spontaneously Stiles presentation, intact with a 3-vessel cord.  The hysterotomy was wiped of all placental membrane fragments and  was closed with a running locked suture of 0 Monocryl and a second horizontal imbricating suture of 0 Monocryl.  The pericolic gutters were then irrigated with moist laps and all underlying tissue layers were made hemostatic with Bovie electrocautery.  The fascial layer was closed with a running suture of 0 Vicryl without palpable defect.  The subcutaneous tissue was irrigated with moist laps and the incision was closed with Insorb staples and dressed with Tegaderm dressing.  All counts were correct and she will be transferred to PACU in stable condition.    Pauline Wilde MD        D: 2023   T: 2023   MT: LISSETT    Name:     FREDDY PIZANO  MRN:      [id number]        Account:        [id number]   :      1990           Procedure Date: 2023     Document: [id number]

## 2023-02-10 ENCOUNTER — TRANSFERRED RECORDS (OUTPATIENT)
Dept: HEALTH INFORMATION MANAGEMENT | Facility: CLINIC | Age: 33
End: 2023-02-10

## 2023-02-10 LAB — HGB BLD-MCNC: 10.7 G/DL (ref 11.7–15.7)

## 2023-02-10 PROCEDURE — 85018 HEMOGLOBIN: CPT | Performed by: OBSTETRICS & GYNECOLOGY

## 2023-02-10 PROCEDURE — 250N000013 HC RX MED GY IP 250 OP 250 PS 637: Performed by: OBSTETRICS & GYNECOLOGY

## 2023-02-10 PROCEDURE — 36415 COLL VENOUS BLD VENIPUNCTURE: CPT | Performed by: OBSTETRICS & GYNECOLOGY

## 2023-02-10 PROCEDURE — 250N000011 HC RX IP 250 OP 636: Performed by: OBSTETRICS & GYNECOLOGY

## 2023-02-10 PROCEDURE — 120N000012 HC R&B POSTPARTUM

## 2023-02-10 RX ADMIN — IBUPROFEN 800 MG: 400 TABLET ORAL at 09:01

## 2023-02-10 RX ADMIN — ACETAMINOPHEN 975 MG: 325 TABLET, FILM COATED ORAL at 21:14

## 2023-02-10 RX ADMIN — SENNOSIDES AND DOCUSATE SODIUM 1 TABLET: 50; 8.6 TABLET ORAL at 20:19

## 2023-02-10 RX ADMIN — IBUPROFEN 800 MG: 400 TABLET ORAL at 21:14

## 2023-02-10 RX ADMIN — SIMETHICONE 80 MG: 80 TABLET, CHEWABLE ORAL at 10:29

## 2023-02-10 RX ADMIN — SIMETHICONE 80 MG: 80 TABLET, CHEWABLE ORAL at 16:15

## 2023-02-10 RX ADMIN — IBUPROFEN 800 MG: 400 TABLET ORAL at 15:15

## 2023-02-10 RX ADMIN — ACETAMINOPHEN 975 MG: 325 TABLET, FILM COATED ORAL at 15:15

## 2023-02-10 RX ADMIN — SENNOSIDES AND DOCUSATE SODIUM 2 TABLET: 50; 8.6 TABLET ORAL at 09:01

## 2023-02-10 RX ADMIN — ACETAMINOPHEN 975 MG: 325 TABLET, FILM COATED ORAL at 00:39

## 2023-02-10 RX ADMIN — OXYCODONE HYDROCHLORIDE 5 MG: 5 TABLET ORAL at 16:15

## 2023-02-10 RX ADMIN — ACETAMINOPHEN 975 MG: 325 TABLET, FILM COATED ORAL at 09:01

## 2023-02-10 RX ADMIN — KETOROLAC TROMETHAMINE 30 MG: 30 INJECTION, SOLUTION INTRAMUSCULAR; INTRAVENOUS at 02:38

## 2023-02-10 ASSESSMENT — ACTIVITIES OF DAILY LIVING (ADL)
ADLS_ACUITY_SCORE: 22
ADLS_ACUITY_SCORE: 23
ADLS_ACUITY_SCORE: 22
ADLS_ACUITY_SCORE: 23
ADLS_ACUITY_SCORE: 22
ADLS_ACUITY_SCORE: 23
ADLS_ACUITY_SCORE: 22
ADLS_ACUITY_SCORE: 22
ADLS_ACUITY_SCORE: 23
ADLS_ACUITY_SCORE: 22

## 2023-02-10 NOTE — LACTATION NOTE
"This note was copied from a baby's chart.  Follow up lactation visit with Anne and baby boy. Anne attempting to get infant latched in cradle hold on right breast at time of visit. Infant fussy, moving around, unable to latch. Anne shares she's been offering formula via bottle for most feedings. Anne's desire is to primarily breastfeed. Suggested removing sleep sack to infant can be positioned more closely to mom. Assisted with latch in football hold. Mother has large nipples and infant was having difficulty maintaining latch. 5fr feeding tube and syringe utilized with formula to help infant maintain latch. Infant was able to latch well with nutritive suck pattern after a few drops of formula given. Anne states \"this is my fault. I gave him too much formula.\" Provided reassurance and support that she has not done anything wrong and we can continue to improve breastfeeding skills as well as offer formula supplement if that is her choice. Reassured Anne that offering formula supplement while infant is at breast is an ideal way to supplement. Reviewed nipple shied as an option to help infant maintain latch. Anne reports successfully breastfeeding first 3 children once milk came in. Encouraged Anne to offer breast when cueing, and offer formula supplement as needed to entice infant to latch, or if infant not satiated after successful breastfeeding session. Anne verbalizes understanding and appreciative of visit.   "

## 2023-02-10 NOTE — PLAN OF CARE
Vital signs stable. Postpartum assessment WDL. Incision pressure dressing, clean, dry, intact. Pain controlled with tylenol and Toradol. Patient ambulating with stand by assist. Breastfeeding on cue with minimal RN assist. Jarrell out, due to void by 830 Patient and infant bonding well. Will continue with current plan of care.

## 2023-02-10 NOTE — PLAN OF CARE
Vital signs stable. Postpartum assessment WDL. Incision CDI with pressure dressing on. Pain controlled with scheduled toradol and tylenol and PRN oxy. Patient ambulating with SBA. Has Jarrell in place. Breastfeeding on cue with minimal assist and supplementing with formula with a bottle. Patient and infant bonding well. Will continue with current plan of care.

## 2023-02-10 NOTE — PLAN OF CARE
Vital signs stable. Postpartum assessment WDL. Incision c/d/I with pressure drsgn on, removed and replaced steri strips. Pain controlled with tylenol, ibuprofen, ice to incision, and abd binder. Patient ambulating with no assistance. Patient reports passing gas but did req gas X with relief. Breastfeeding on cue with no assist. Patient and infant bonding well.  here to go over baby's labs and answer questions with the pediatrician.  Will continue with current plan of care.

## 2023-02-11 PROCEDURE — 250N000013 HC RX MED GY IP 250 OP 250 PS 637: Performed by: OBSTETRICS & GYNECOLOGY

## 2023-02-11 PROCEDURE — 120N000012 HC R&B POSTPARTUM

## 2023-02-11 RX ADMIN — SENNOSIDES AND DOCUSATE SODIUM 1 TABLET: 50; 8.6 TABLET ORAL at 09:53

## 2023-02-11 RX ADMIN — ACETAMINOPHEN 975 MG: 325 TABLET, FILM COATED ORAL at 09:53

## 2023-02-11 RX ADMIN — SENNOSIDES AND DOCUSATE SODIUM 1 TABLET: 50; 8.6 TABLET ORAL at 20:02

## 2023-02-11 RX ADMIN — ACETAMINOPHEN 975 MG: 325 TABLET, FILM COATED ORAL at 16:37

## 2023-02-11 RX ADMIN — ACETAMINOPHEN 975 MG: 325 TABLET, FILM COATED ORAL at 22:18

## 2023-02-11 RX ADMIN — ACETAMINOPHEN 975 MG: 325 TABLET, FILM COATED ORAL at 02:52

## 2023-02-11 RX ADMIN — IBUPROFEN 800 MG: 400 TABLET ORAL at 22:17

## 2023-02-11 RX ADMIN — OXYCODONE HYDROCHLORIDE 5 MG: 5 TABLET ORAL at 13:38

## 2023-02-11 RX ADMIN — IBUPROFEN 800 MG: 400 TABLET ORAL at 09:53

## 2023-02-11 RX ADMIN — IBUPROFEN 800 MG: 400 TABLET ORAL at 16:37

## 2023-02-11 RX ADMIN — IBUPROFEN 800 MG: 400 TABLET ORAL at 02:52

## 2023-02-11 ASSESSMENT — ACTIVITIES OF DAILY LIVING (ADL)
ADLS_ACUITY_SCORE: 19
ADLS_ACUITY_SCORE: 22

## 2023-02-11 NOTE — PROGRESS NOTES
"OB Postpartum Note    S:  Patient without complaints. Nausea controlled, tolerating regular diet. Ambulating, voiding without difficulty. Passing flatus. Minimal lochia. Breastfeeding well. Pain controlled.     O:  Vitals were reviewed  Blood pressure 105/72, pulse 82, temperature 98  F (36.7  C), temperature source Oral, resp. rate 16, height 1.448 m (4' 9\"), weight 54.4 kg (120 lb), SpO2 100 %, unknown if currently breastfeeding.          Gen - NAD, resting        CV - RRR        Abdomen - Fundus firm, below umbilicus, nontender        Incision - C/D/I        Extremities - No calf tenderness, no unilateral edema    Hemoglobin   Date Value Ref Range Status   02/10/2023 10.7 (L) 11.7 - 15.7 g/dL Final   ]  No results found for: ABO  No results found for: RH  No components found for: RUBELLA    A:   Postoperative Day #2 , s/p repeat CS - doing well    P:  1)  Routine care--patient desires discharge tomorrow.         2)  Reviewed ambulation, breastfeeding.       Maria Elena Escobar MD                    "

## 2023-02-11 NOTE — PLAN OF CARE
Vital signs stable. Postpartum assessment WDL. Incision c/d/I with steri strips. Pain controlled with tyl, ibuprofen, oxycodone PRN, heat for cramps and ice to incision PRN. Patient ambulating with no assist. Patient reports passing gas. Breastfeeding on cue with no assist. Also supp pt with btl today and formula.  Patient and infant bonding well. Will continue with current plan of care.

## 2023-02-11 NOTE — PLAN OF CARE
Vital signs stable. Postpartum assessment WDL. Incision open to air and secured with steri strips, no drainage noted. Pain controlled with tylenol and ibuprofen. Oxycodone offered- patient declined. Using warm packs to abdomen for uterine cramping and ice pack to incision. Patient up ad bozena and voiding without difficulty. Breastfeeding on cue independently/with minimal staff assistance. Patient and infant bonding well. Will continue with current plan of care.

## 2023-02-12 ENCOUNTER — HEALTH MAINTENANCE LETTER (OUTPATIENT)
Age: 33
End: 2023-02-12

## 2023-02-12 VITALS
SYSTOLIC BLOOD PRESSURE: 117 MMHG | RESPIRATION RATE: 16 BRPM | OXYGEN SATURATION: 98 % | WEIGHT: 120 LBS | TEMPERATURE: 98.2 F | BODY MASS INDEX: 25.89 KG/M2 | HEART RATE: 85 BPM | DIASTOLIC BLOOD PRESSURE: 72 MMHG | HEIGHT: 57 IN

## 2023-02-12 PROCEDURE — 250N000013 HC RX MED GY IP 250 OP 250 PS 637: Performed by: OBSTETRICS & GYNECOLOGY

## 2023-02-12 RX ORDER — OXYCODONE HYDROCHLORIDE 5 MG/1
5 TABLET ORAL EVERY 6 HOURS PRN
Qty: 6 TABLET | Refills: 0 | Status: ON HOLD | OUTPATIENT
Start: 2023-02-12 | End: 2024-07-20

## 2023-02-12 RX ORDER — IBUPROFEN 800 MG/1
800 TABLET, FILM COATED ORAL EVERY 8 HOURS PRN
Qty: 90 TABLET | Refills: 0 | Status: ON HOLD | OUTPATIENT
Start: 2023-02-12 | End: 2024-07-20

## 2023-02-12 RX ORDER — AMOXICILLIN 250 MG
1 CAPSULE ORAL 2 TIMES DAILY PRN
Qty: 60 TABLET | Refills: 0 | Status: ON HOLD | OUTPATIENT
Start: 2023-02-12 | End: 2024-07-20

## 2023-02-12 RX ORDER — ACETAMINOPHEN 325 MG/1
650 TABLET ORAL EVERY 6 HOURS PRN
Qty: 90 TABLET | Refills: 0 | Status: ON HOLD | OUTPATIENT
Start: 2023-02-12 | End: 2024-07-20

## 2023-02-12 RX ADMIN — IBUPROFEN 800 MG: 400 TABLET ORAL at 10:52

## 2023-02-12 RX ADMIN — ACETAMINOPHEN 975 MG: 325 TABLET, FILM COATED ORAL at 04:32

## 2023-02-12 RX ADMIN — ACETAMINOPHEN 975 MG: 325 TABLET, FILM COATED ORAL at 10:52

## 2023-02-12 RX ADMIN — OXYCODONE HYDROCHLORIDE 5 MG: 5 TABLET ORAL at 10:52

## 2023-02-12 RX ADMIN — IBUPROFEN 800 MG: 400 TABLET ORAL at 04:32

## 2023-02-12 ASSESSMENT — ACTIVITIES OF DAILY LIVING (ADL)
ADLS_ACUITY_SCORE: 19

## 2023-02-12 NOTE — PROGRESS NOTES
"OB Postpartum Note    S:  Patient without complaints. Nausea controlled, tolerating regular diet. Ambulating, voiding without difficulty. Passing flatus. Minimal lochia. Breastfeeding well. Pain controlled.     O:  Vitals were reviewed  Blood pressure 113/71, pulse 85, temperature 98.1  F (36.7  C), temperature source Oral, resp. rate 16, height 1.448 m (4' 9\"), weight 54.4 kg (120 lb), SpO2 100 %, unknown if currently breastfeeding.          Gen - NAD, resting        CV - RRR        Abdomen - Fundus firm, below umbilicus, nontender        Incision - C/D/I         Extremities - No calf tenderness, no unilateral edema    Hemoglobin   Date Value Ref Range Status   02/10/2023 10.7 (L) 11.7 - 15.7 g/dL Final   ]  No results found for: ABO  No results found for: RH  No components found for: RUBELLA    A:   Postoperative Day #3 , s/p CS - doing well    P:  1)  Discharge home        2)  F/U 2 and 6 weeks w/ Primary OB      Maria Elena Escobar MD                      "

## 2023-02-12 NOTE — PLAN OF CARE
Vital signs stable. Postpartum assessment WDL. Incision c/d/I with steri strips. Pain controlled with tyl, ibuprofen, abd binder. Patient ambulating with no. Patient reports passing gas. Breastfeeding on cue with no assist. Patient and infant bonding well. Will continue with current plan of care.      D: VSS, assessments WDL.   I: Pt. received complete discharge paperwork and home medications as filled by discharge pharmacy here.  Pt. was given times of last dose for all discharge medications in writing on discharge medication sheets.  Discharge teaching included home medication, pain management, activity restrictions, postpartum cares, and signs and symptoms of infection.    A: Discharge outcomes on care plan met.  Mother states understanding and comfort with self and baby cares.  P: Pt. discharged to home.  Pt. was discharged with baby, and bands were checked at time of discharge.  Pt. was accompanied by , nurse and baby, and left with personal belongings.  Pt. to follow up with OB per MD order.  Pt. had no further questions at the time of discharge and no unmet needs were identified.

## 2023-02-12 NOTE — PLAN OF CARE
Vital signs stable. Postpartum assessment WDL. Incision open to air and secured with steri strips- scant dried sanguinous drainage noted. Pain controlled with tylenol and ibuprofen. Using warm packs to abdomen for uterine cramping. Patient up ad bozena and is voiding without difficulty. Breastfeeding on cue independently. Milk is in- breasts are full and firm. Bottle feeding infant formula PRN per patient choice. Patient and infant bonding well. Will continue with current plan of care.

## 2023-02-28 NOTE — DISCHARGE SUMMARY
Pt was admitted on 2/9/23 for repeat c/s. She underwent an uncomplicated procedure. She was ambulating and tolerating oral intake by POD#1. By POD#3 she reached milestones and was discharged home with routine teaching.     Pauline Wilde MD

## 2023-04-18 ENCOUNTER — TRANSCRIBE ORDERS (OUTPATIENT)
Dept: OTHER | Age: 33
End: 2023-04-18

## 2023-04-18 DIAGNOSIS — L81.9 HYPERPIGMENTATION: Primary | ICD-10-CM

## 2023-12-28 LAB
RUBELLA ANTIBODY IGG (EXTERNAL): NORMAL
VDRL (SYPHILIS) (EXTERNAL): NONREACTIVE

## 2023-12-29 LAB
HEPATITIS B SURFACE ANTIGEN (EXTERNAL): NONREACTIVE
HIV1+2 AB SERPL QL IA: NEGATIVE

## 2024-03-10 ENCOUNTER — HEALTH MAINTENANCE LETTER (OUTPATIENT)
Age: 34
End: 2024-03-10

## 2024-06-28 LAB — GROUP B STREPTOCOCCUS (EXTERNAL): NEGATIVE

## 2024-07-18 ENCOUNTER — ANESTHESIA EVENT (OUTPATIENT)
Dept: OBGYN | Facility: CLINIC | Age: 34
End: 2024-07-18
Payer: COMMERCIAL

## 2024-07-18 ENCOUNTER — HOSPITAL ENCOUNTER (INPATIENT)
Facility: CLINIC | Age: 34
LOS: 2 days | Discharge: HOME | End: 2024-07-20
Attending: OBSTETRICS & GYNECOLOGY | Admitting: OBSTETRICS & GYNECOLOGY
Payer: COMMERCIAL

## 2024-07-18 ENCOUNTER — ANESTHESIA (OUTPATIENT)
Dept: OBGYN | Facility: CLINIC | Age: 34
End: 2024-07-18
Payer: COMMERCIAL

## 2024-07-18 DIAGNOSIS — Z98.891 S/P REPEAT LOW TRANSVERSE C-SECTION: ICD-10-CM

## 2024-07-18 LAB
ABO/RH(D): NORMAL
ANTIBODY SCREEN: NEGATIVE
HGB BLD-MCNC: 11.6 G/DL (ref 11.7–15.7)
SPECIMEN EXPIRATION DATE: NORMAL
T PALLIDUM AB SER QL: NONREACTIVE

## 2024-07-18 PROCEDURE — 120N000012 HC R&B POSTPARTUM

## 2024-07-18 PROCEDURE — 250N000011 HC RX IP 250 OP 636: Performed by: ANESTHESIOLOGY

## 2024-07-18 PROCEDURE — 36415 COLL VENOUS BLD VENIPUNCTURE: CPT | Performed by: PHYSICIAN ASSISTANT

## 2024-07-18 PROCEDURE — 250N000011 HC RX IP 250 OP 636: Performed by: OBSTETRICS & GYNECOLOGY

## 2024-07-18 PROCEDURE — 258N000003 HC RX IP 258 OP 636: Performed by: PHYSICIAN ASSISTANT

## 2024-07-18 PROCEDURE — 250N000013 HC RX MED GY IP 250 OP 250 PS 637: Performed by: OBSTETRICS & GYNECOLOGY

## 2024-07-18 PROCEDURE — 258N000003 HC RX IP 258 OP 636: Performed by: ANESTHESIOLOGY

## 2024-07-18 PROCEDURE — 85018 HEMOGLOBIN: CPT | Performed by: PHYSICIAN ASSISTANT

## 2024-07-18 PROCEDURE — 99207 PR NO BILLABLE SERVICE THIS VISIT: CPT | Performed by: NURSE PRACTITIONER

## 2024-07-18 PROCEDURE — 710N000009 HC RECOVERY PHASE 1, LEVEL 1, PER MIN: Performed by: OBSTETRICS & GYNECOLOGY

## 2024-07-18 PROCEDURE — 86900 BLOOD TYPING SEROLOGIC ABO: CPT | Performed by: PHYSICIAN ASSISTANT

## 2024-07-18 PROCEDURE — 250N000009 HC RX 250: Performed by: ANESTHESIOLOGY

## 2024-07-18 PROCEDURE — 250N000011 HC RX IP 250 OP 636: Performed by: PHYSICIAN ASSISTANT

## 2024-07-18 PROCEDURE — 360N000076 HC SURGERY LEVEL 3, PER MIN: Performed by: OBSTETRICS & GYNECOLOGY

## 2024-07-18 PROCEDURE — 370N000017 HC ANESTHESIA TECHNICAL FEE, PER MIN: Performed by: OBSTETRICS & GYNECOLOGY

## 2024-07-18 PROCEDURE — 59510 CESAREAN DELIVERY: CPT | Performed by: NURSE ANESTHETIST, CERTIFIED REGISTERED

## 2024-07-18 PROCEDURE — 86780 TREPONEMA PALLIDUM: CPT | Performed by: PHYSICIAN ASSISTANT

## 2024-07-18 PROCEDURE — 59510 CESAREAN DELIVERY: CPT | Performed by: ANESTHESIOLOGY

## 2024-07-18 PROCEDURE — 272N000001 HC OR GENERAL SUPPLY STERILE: Performed by: OBSTETRICS & GYNECOLOGY

## 2024-07-18 RX ORDER — LOPERAMIDE HCL 2 MG
4 CAPSULE ORAL
Status: DISCONTINUED | OUTPATIENT
Start: 2024-07-18 | End: 2024-07-18

## 2024-07-18 RX ORDER — METHYLERGONOVINE MALEATE 0.2 MG/ML
200 INJECTION INTRAVENOUS
Status: DISCONTINUED | OUTPATIENT
Start: 2024-07-18 | End: 2024-07-18

## 2024-07-18 RX ORDER — CITRIC ACID/SODIUM CITRATE 334-500MG
30 SOLUTION, ORAL ORAL
Status: COMPLETED | OUTPATIENT
Start: 2024-07-18 | End: 2024-07-18

## 2024-07-18 RX ORDER — VITAMIN A ACETATE, .BETA.-CAROTENE, ASCORBIC ACID, CHOLECALCIFEROL, .ALPHA.-TOCOPHEROL ACETATE, DL-, THIAMINE MONONITRATE, RIBOFLAVIN, NIACINAMIDE, PYRIDOXINE HYDROCHLORIDE, FOLIC ACID, CYANOCOBALAMIN, CALCIUM CARBONATE, FERROUS FUMARATE, ZINC OXIDE, AND CUPRIC OXIDE 2000; 2000; 120; 400; 22; 1.84; 3; 20; 10; 1; 12; 200; 27; 25; 2 [IU]/1; [IU]/1; MG/1; [IU]/1; MG/1; MG/1; MG/1; MG/1; MG/1; MG/1; UG/1; MG/1; MG/1; MG/1; MG/1
1 TABLET ORAL DAILY
COMMUNITY

## 2024-07-18 RX ORDER — TRANEXAMIC ACID 10 MG/ML
1 INJECTION, SOLUTION INTRAVENOUS EVERY 30 MIN PRN
Status: DISCONTINUED | OUTPATIENT
Start: 2024-07-18 | End: 2024-07-20 | Stop reason: HOSPADM

## 2024-07-18 RX ORDER — BUPIVACAINE HYDROCHLORIDE 7.5 MG/ML
INJECTION, SOLUTION INTRASPINAL
Status: DISCONTINUED | OUTPATIENT
Start: 2024-07-18 | End: 2024-07-18

## 2024-07-18 RX ORDER — MISOPROSTOL 200 UG/1
400 TABLET ORAL
Status: DISCONTINUED | OUTPATIENT
Start: 2024-07-18 | End: 2024-07-20 | Stop reason: HOSPADM

## 2024-07-18 RX ORDER — MORPHINE SULFATE 1 MG/ML
INJECTION, SOLUTION EPIDURAL; INTRATHECAL; INTRAVENOUS
Status: DISCONTINUED | OUTPATIENT
Start: 2024-07-18 | End: 2024-07-18

## 2024-07-18 RX ORDER — TRANEXAMIC ACID 10 MG/ML
1 INJECTION, SOLUTION INTRAVENOUS EVERY 30 MIN PRN
Status: DISCONTINUED | OUTPATIENT
Start: 2024-07-18 | End: 2024-07-18

## 2024-07-18 RX ORDER — LOPERAMIDE HCL 2 MG
2 CAPSULE ORAL
Status: DISCONTINUED | OUTPATIENT
Start: 2024-07-18 | End: 2024-07-20 | Stop reason: HOSPADM

## 2024-07-18 RX ORDER — FENTANYL CITRATE-0.9 % NACL/PF 10 MCG/ML
100 PLASTIC BAG, INJECTION (ML) INTRAVENOUS EVERY 5 MIN PRN
Status: DISCONTINUED | OUTPATIENT
Start: 2024-07-18 | End: 2024-07-20 | Stop reason: HOSPADM

## 2024-07-18 RX ORDER — OXYTOCIN 10 [USP'U]/ML
10 INJECTION, SOLUTION INTRAMUSCULAR; INTRAVENOUS
Status: DISCONTINUED | OUTPATIENT
Start: 2024-07-18 | End: 2024-07-18

## 2024-07-18 RX ORDER — CEFAZOLIN SODIUM/WATER 2 G/20 ML
2 SYRINGE (ML) INTRAVENOUS SEE ADMIN INSTRUCTIONS
Status: DISCONTINUED | OUTPATIENT
Start: 2024-07-18 | End: 2024-07-18

## 2024-07-18 RX ORDER — KETOROLAC TROMETHAMINE 30 MG/ML
30 INJECTION, SOLUTION INTRAMUSCULAR; INTRAVENOUS EVERY 6 HOURS
Status: COMPLETED | OUTPATIENT
Start: 2024-07-18 | End: 2024-07-19

## 2024-07-18 RX ORDER — LOPERAMIDE HCL 2 MG
4 CAPSULE ORAL
Status: DISCONTINUED | OUTPATIENT
Start: 2024-07-18 | End: 2024-07-20 | Stop reason: HOSPADM

## 2024-07-18 RX ORDER — AMOXICILLIN 250 MG
1 CAPSULE ORAL 2 TIMES DAILY
Status: DISCONTINUED | OUTPATIENT
Start: 2024-07-18 | End: 2024-07-20 | Stop reason: HOSPADM

## 2024-07-18 RX ORDER — ACETAMINOPHEN 325 MG/1
975 TABLET ORAL EVERY 6 HOURS
Status: DISCONTINUED | OUTPATIENT
Start: 2024-07-18 | End: 2024-07-20 | Stop reason: HOSPADM

## 2024-07-18 RX ORDER — HYDROMORPHONE HCL IN WATER/PF 6 MG/30 ML
PATIENT CONTROLLED ANALGESIA SYRINGE INTRAVENOUS
Status: DISCONTINUED
Start: 2024-07-18 | End: 2024-07-18 | Stop reason: HOSPADM

## 2024-07-18 RX ORDER — NALOXONE HYDROCHLORIDE 0.4 MG/ML
0.2 INJECTION, SOLUTION INTRAMUSCULAR; INTRAVENOUS; SUBCUTANEOUS
Status: DISCONTINUED | OUTPATIENT
Start: 2024-07-18 | End: 2024-07-20 | Stop reason: HOSPADM

## 2024-07-18 RX ORDER — LIDOCAINE 40 MG/G
CREAM TOPICAL
Status: DISCONTINUED | OUTPATIENT
Start: 2024-07-18 | End: 2024-07-20 | Stop reason: HOSPADM

## 2024-07-18 RX ORDER — HYDROMORPHONE HCL IN WATER/PF 6 MG/30 ML
0.2 PATIENT CONTROLLED ANALGESIA SYRINGE INTRAVENOUS
Status: DISCONTINUED | OUTPATIENT
Start: 2024-07-18 | End: 2024-07-20 | Stop reason: HOSPADM

## 2024-07-18 RX ORDER — ONDANSETRON 2 MG/ML
4 INJECTION INTRAMUSCULAR; INTRAVENOUS EVERY 6 HOURS PRN
Status: DISCONTINUED | OUTPATIENT
Start: 2024-07-18 | End: 2024-07-20 | Stop reason: HOSPADM

## 2024-07-18 RX ORDER — MORPHINE SULFATE 1 MG/ML
INJECTION, SOLUTION EPIDURAL; INTRATHECAL; INTRAVENOUS PRN
Status: DISCONTINUED | OUTPATIENT
Start: 2024-07-18 | End: 2024-07-18

## 2024-07-18 RX ORDER — LIDOCAINE 40 MG/G
CREAM TOPICAL
Status: DISCONTINUED | OUTPATIENT
Start: 2024-07-18 | End: 2024-07-18

## 2024-07-18 RX ORDER — METHYLERGONOVINE MALEATE 0.2 MG/ML
200 INJECTION INTRAVENOUS
Status: DISCONTINUED | OUTPATIENT
Start: 2024-07-18 | End: 2024-07-20 | Stop reason: HOSPADM

## 2024-07-18 RX ORDER — OXYTOCIN/0.9 % SODIUM CHLORIDE 30/500 ML
340 PLASTIC BAG, INJECTION (ML) INTRAVENOUS CONTINUOUS PRN
Status: DISCONTINUED | OUTPATIENT
Start: 2024-07-18 | End: 2024-07-20 | Stop reason: HOSPADM

## 2024-07-18 RX ORDER — NALBUPHINE HYDROCHLORIDE 20 MG/ML
2.5-5 INJECTION, SOLUTION INTRAMUSCULAR; INTRAVENOUS; SUBCUTANEOUS EVERY 6 HOURS PRN
Status: DISCONTINUED | OUTPATIENT
Start: 2024-07-18 | End: 2024-07-20 | Stop reason: HOSPADM

## 2024-07-18 RX ORDER — ACETAMINOPHEN 325 MG/1
TABLET ORAL
Status: COMPLETED
Start: 2024-07-18 | End: 2024-07-18

## 2024-07-18 RX ORDER — PROCHLORPERAZINE MALEATE 10 MG
10 TABLET ORAL EVERY 6 HOURS PRN
Status: DISCONTINUED | OUTPATIENT
Start: 2024-07-18 | End: 2024-07-20 | Stop reason: HOSPADM

## 2024-07-18 RX ORDER — CEFAZOLIN SODIUM/WATER 2 G/20 ML
2 SYRINGE (ML) INTRAVENOUS
Status: COMPLETED | OUTPATIENT
Start: 2024-07-18 | End: 2024-07-18

## 2024-07-18 RX ORDER — DEXTROSE, SODIUM CHLORIDE, SODIUM LACTATE, POTASSIUM CHLORIDE, AND CALCIUM CHLORIDE 5; .6; .31; .03; .02 G/100ML; G/100ML; G/100ML; G/100ML; G/100ML
INJECTION, SOLUTION INTRAVENOUS CONTINUOUS
Status: DISCONTINUED | OUTPATIENT
Start: 2024-07-18 | End: 2024-07-20 | Stop reason: HOSPADM

## 2024-07-18 RX ORDER — OXYCODONE HYDROCHLORIDE 5 MG/1
5 TABLET ORAL EVERY 4 HOURS PRN
Status: DISCONTINUED | OUTPATIENT
Start: 2024-07-18 | End: 2024-07-20 | Stop reason: HOSPADM

## 2024-07-18 RX ORDER — NALOXONE HYDROCHLORIDE 0.4 MG/ML
0.4 INJECTION, SOLUTION INTRAMUSCULAR; INTRAVENOUS; SUBCUTANEOUS
Status: DISCONTINUED | OUTPATIENT
Start: 2024-07-18 | End: 2024-07-20 | Stop reason: HOSPADM

## 2024-07-18 RX ORDER — PROCHLORPERAZINE 25 MG
25 SUPPOSITORY, RECTAL RECTAL EVERY 12 HOURS PRN
Status: DISCONTINUED | OUTPATIENT
Start: 2024-07-18 | End: 2024-07-20 | Stop reason: HOSPADM

## 2024-07-18 RX ORDER — BISACODYL 10 MG
10 SUPPOSITORY, RECTAL RECTAL DAILY PRN
Status: DISCONTINUED | OUTPATIENT
Start: 2024-07-20 | End: 2024-07-20 | Stop reason: HOSPADM

## 2024-07-18 RX ORDER — MISOPROSTOL 200 UG/1
400 TABLET ORAL
Status: DISCONTINUED | OUTPATIENT
Start: 2024-07-18 | End: 2024-07-18

## 2024-07-18 RX ORDER — METOCLOPRAMIDE HYDROCHLORIDE 5 MG/ML
10 INJECTION INTRAMUSCULAR; INTRAVENOUS EVERY 6 HOURS PRN
Status: DISCONTINUED | OUTPATIENT
Start: 2024-07-18 | End: 2024-07-20 | Stop reason: HOSPADM

## 2024-07-18 RX ORDER — SIMETHICONE 80 MG
80 TABLET,CHEWABLE ORAL 4 TIMES DAILY PRN
Status: DISCONTINUED | OUTPATIENT
Start: 2024-07-18 | End: 2024-07-20 | Stop reason: HOSPADM

## 2024-07-18 RX ORDER — OXYTOCIN/0.9 % SODIUM CHLORIDE 30/500 ML
100-340 PLASTIC BAG, INJECTION (ML) INTRAVENOUS CONTINUOUS PRN
Status: DISCONTINUED | OUTPATIENT
Start: 2024-07-18 | End: 2024-07-18

## 2024-07-18 RX ORDER — KETOROLAC TROMETHAMINE 30 MG/ML
INJECTION, SOLUTION INTRAMUSCULAR; INTRAVENOUS PRN
Status: DISCONTINUED | OUTPATIENT
Start: 2024-07-18 | End: 2024-07-18

## 2024-07-18 RX ORDER — CARBOPROST TROMETHAMINE 250 UG/ML
250 INJECTION, SOLUTION INTRAMUSCULAR
Status: DISCONTINUED | OUTPATIENT
Start: 2024-07-18 | End: 2024-07-20 | Stop reason: HOSPADM

## 2024-07-18 RX ORDER — ONDANSETRON 2 MG/ML
INJECTION INTRAMUSCULAR; INTRAVENOUS PRN
Status: DISCONTINUED | OUTPATIENT
Start: 2024-07-18 | End: 2024-07-18

## 2024-07-18 RX ORDER — CARBOPROST TROMETHAMINE 250 UG/ML
250 INJECTION, SOLUTION INTRAMUSCULAR
Status: DISCONTINUED | OUTPATIENT
Start: 2024-07-18 | End: 2024-07-18

## 2024-07-18 RX ORDER — LOPERAMIDE HCL 2 MG
2 CAPSULE ORAL
Status: DISCONTINUED | OUTPATIENT
Start: 2024-07-18 | End: 2024-07-18

## 2024-07-18 RX ORDER — METOCLOPRAMIDE 10 MG/1
10 TABLET ORAL EVERY 6 HOURS PRN
Status: DISCONTINUED | OUTPATIENT
Start: 2024-07-18 | End: 2024-07-20 | Stop reason: HOSPADM

## 2024-07-18 RX ORDER — OXYTOCIN 10 [USP'U]/ML
10 INJECTION, SOLUTION INTRAMUSCULAR; INTRAVENOUS
Status: DISCONTINUED | OUTPATIENT
Start: 2024-07-18 | End: 2024-07-20 | Stop reason: HOSPADM

## 2024-07-18 RX ORDER — MODIFIED LANOLIN
OINTMENT (GRAM) TOPICAL
Status: DISCONTINUED | OUTPATIENT
Start: 2024-07-18 | End: 2024-07-20 | Stop reason: HOSPADM

## 2024-07-18 RX ORDER — ACETAMINOPHEN 325 MG/1
975 TABLET ORAL ONCE
Status: COMPLETED | OUTPATIENT
Start: 2024-07-18 | End: 2024-07-18

## 2024-07-18 RX ORDER — OXYTOCIN/0.9 % SODIUM CHLORIDE 30/500 ML
PLASTIC BAG, INJECTION (ML) INTRAVENOUS CONTINUOUS PRN
Status: DISCONTINUED | OUTPATIENT
Start: 2024-07-18 | End: 2024-07-18

## 2024-07-18 RX ORDER — OXYTOCIN/0.9 % SODIUM CHLORIDE 30/500 ML
340 PLASTIC BAG, INJECTION (ML) INTRAVENOUS CONTINUOUS PRN
Status: DISCONTINUED | OUTPATIENT
Start: 2024-07-18 | End: 2024-07-18

## 2024-07-18 RX ORDER — HYDROCORTISONE 25 MG/G
CREAM TOPICAL 3 TIMES DAILY PRN
Status: DISCONTINUED | OUTPATIENT
Start: 2024-07-18 | End: 2024-07-20 | Stop reason: HOSPADM

## 2024-07-18 RX ORDER — IBUPROFEN 400 MG/1
800 TABLET, FILM COATED ORAL EVERY 6 HOURS
Status: DISCONTINUED | OUTPATIENT
Start: 2024-07-19 | End: 2024-07-20 | Stop reason: HOSPADM

## 2024-07-18 RX ORDER — MULTIVITAMIN WITH IRON
1 TABLET ORAL DAILY
Status: ON HOLD | COMMUNITY
End: 2024-07-20

## 2024-07-18 RX ORDER — ONDANSETRON 4 MG/1
4 TABLET, ORALLY DISINTEGRATING ORAL EVERY 6 HOURS PRN
Status: DISCONTINUED | OUTPATIENT
Start: 2024-07-18 | End: 2024-07-20 | Stop reason: HOSPADM

## 2024-07-18 RX ORDER — AMOXICILLIN 250 MG
2 CAPSULE ORAL 2 TIMES DAILY
Status: DISCONTINUED | OUTPATIENT
Start: 2024-07-18 | End: 2024-07-20 | Stop reason: HOSPADM

## 2024-07-18 RX ORDER — MISOPROSTOL 200 UG/1
800 TABLET ORAL
Status: DISCONTINUED | OUTPATIENT
Start: 2024-07-18 | End: 2024-07-18

## 2024-07-18 RX ORDER — CEFAZOLIN SODIUM/WATER 2 G/20 ML
SYRINGE (ML) INTRAVENOUS
Status: DISCONTINUED
Start: 2024-07-18 | End: 2024-07-18 | Stop reason: HOSPADM

## 2024-07-18 RX ORDER — CITRIC ACID/SODIUM CITRATE 334-500MG
SOLUTION, ORAL ORAL
Status: COMPLETED
Start: 2024-07-18 | End: 2024-07-18

## 2024-07-18 RX ORDER — SODIUM CHLORIDE, SODIUM LACTATE, POTASSIUM CHLORIDE, CALCIUM CHLORIDE 600; 310; 30; 20 MG/100ML; MG/100ML; MG/100ML; MG/100ML
INJECTION, SOLUTION INTRAVENOUS CONTINUOUS
Status: DISCONTINUED | OUTPATIENT
Start: 2024-07-18 | End: 2024-07-18

## 2024-07-18 RX ORDER — MISOPROSTOL 200 UG/1
800 TABLET ORAL
Status: DISCONTINUED | OUTPATIENT
Start: 2024-07-18 | End: 2024-07-20 | Stop reason: HOSPADM

## 2024-07-18 RX ADMIN — BUPIVACAINE HYDROCHLORIDE IN DEXTROSE 1.6 ML: 7.5 INJECTION, SOLUTION SUBARACHNOID at 07:30

## 2024-07-18 RX ADMIN — Medication 2 G: at 07:33

## 2024-07-18 RX ADMIN — ACETAMINOPHEN 975 MG: 325 TABLET, FILM COATED ORAL at 13:07

## 2024-07-18 RX ADMIN — MORPHINE SULFATE 0.15 MG: 1 INJECTION, SOLUTION EPIDURAL; INTRATHECAL; INTRAVENOUS at 07:30

## 2024-07-18 RX ADMIN — SODIUM CHLORIDE, SODIUM LACTATE, POTASSIUM CHLORIDE, CALCIUM CHLORIDE AND DEXTROSE MONOHYDRATE: 5; 600; 310; 30; 20 INJECTION, SOLUTION INTRAVENOUS at 12:04

## 2024-07-18 RX ADMIN — NALBUPHINE HYDROCHLORIDE 2.6 MG: 20 INJECTION, SOLUTION INTRAMUSCULAR; INTRAVENOUS; SUBCUTANEOUS at 12:59

## 2024-07-18 RX ADMIN — PHENYLEPHRINE HYDROCHLORIDE 0.5 MCG/KG/MIN: 10 INJECTION INTRAVENOUS at 07:33

## 2024-07-18 RX ADMIN — HYDROMORPHONE HYDROCHLORIDE 0.2 MG: 0.2 INJECTION, SOLUTION INTRAMUSCULAR; INTRAVENOUS; SUBCUTANEOUS at 11:20

## 2024-07-18 RX ADMIN — SODIUM CHLORIDE, SODIUM LACTATE, POTASSIUM CHLORIDE, CALCIUM CHLORIDE AND DEXTROSE MONOHYDRATE: 5; 600; 310; 30; 20 INJECTION, SOLUTION INTRAVENOUS at 20:54

## 2024-07-18 RX ADMIN — Medication 30 ML: at 06:57

## 2024-07-18 RX ADMIN — SODIUM CITRATE AND CITRIC ACID MONOHYDRATE 30 ML: 500; 334 SOLUTION ORAL at 06:57

## 2024-07-18 RX ADMIN — ACETAMINOPHEN 975 MG: 325 TABLET, FILM COATED ORAL at 20:34

## 2024-07-18 RX ADMIN — ONDANSETRON 4 MG: 2 INJECTION INTRAMUSCULAR; INTRAVENOUS at 07:33

## 2024-07-18 RX ADMIN — ACETAMINOPHEN 975 MG: 325 TABLET, FILM COATED ORAL at 06:57

## 2024-07-18 RX ADMIN — SODIUM CHLORIDE, POTASSIUM CHLORIDE, SODIUM LACTATE AND CALCIUM CHLORIDE 500 ML: 600; 310; 30; 20 INJECTION, SOLUTION INTRAVENOUS at 06:17

## 2024-07-18 RX ADMIN — KETOROLAC TROMETHAMINE 30 MG: 30 INJECTION, SOLUTION INTRAMUSCULAR at 14:04

## 2024-07-18 RX ADMIN — NALBUPHINE HYDROCHLORIDE 2.6 MG: 20 INJECTION, SOLUTION INTRAMUSCULAR; INTRAVENOUS; SUBCUTANEOUS at 20:35

## 2024-07-18 RX ADMIN — ACETAMINOPHEN 975 MG: 325 TABLET ORAL at 06:57

## 2024-07-18 RX ADMIN — KETOROLAC TROMETHAMINE 30 MG: 30 INJECTION, SOLUTION INTRAMUSCULAR at 08:11

## 2024-07-18 RX ADMIN — HYDROMORPHONE HYDROCHLORIDE 0.2 MG: 0.2 INJECTION, SOLUTION INTRAMUSCULAR; INTRAVENOUS; SUBCUTANEOUS at 09:44

## 2024-07-18 RX ADMIN — Medication 340 ML/HR: at 07:55

## 2024-07-18 RX ADMIN — SENNOSIDES AND DOCUSATE SODIUM 1 TABLET: 50; 8.6 TABLET ORAL at 20:34

## 2024-07-18 RX ADMIN — SODIUM CHLORIDE, POTASSIUM CHLORIDE, SODIUM LACTATE AND CALCIUM CHLORIDE: 600; 310; 30; 20 INJECTION, SOLUTION INTRAVENOUS at 06:16

## 2024-07-18 RX ADMIN — KETOROLAC TROMETHAMINE 30 MG: 30 INJECTION, SOLUTION INTRAMUSCULAR at 20:34

## 2024-07-18 ASSESSMENT — ACTIVITIES OF DAILY LIVING (ADL)
ADLS_ACUITY_SCORE: 18

## 2024-07-18 ASSESSMENT — ENCOUNTER SYMPTOMS: SEIZURES: 0

## 2024-07-18 ASSESSMENT — COPD QUESTIONNAIRES: COPD: 0

## 2024-07-18 NOTE — PLAN OF CARE
"  Problem: Adult Inpatient Plan of Care  Goal: Plan of Care Review  Description: The Plan of Care Review/Shift note should be completed every shift.  The Outcome Evaluation is a brief statement about your assessment that the patient is improving, declining, or no change.  This information will be displayed automatically on your shift  note.  Flowsheets (Taken 7/18/2024 0644)  Plan of Care Reviewed With:   patient   spouse  Overall Patient Progress: improving  Goal: Patient-Specific Goal (Individualized)  Description: You can add care plan individualizations to a care plan. Examples of Individualization might be:  \"Parent requests to be called daily at 9am for status\", \"I have a hard time hearing out of my right ear\", or \"Do not touch me to wake me up as it startles  me\".  Flowsheets (Taken 7/18/2024 0644)  Individualized Care Needs:  present  Goal: Readiness for Transition of Care  Intervention: Mutually Develop Transition Plan  Recent Flowsheet Documentation  Taken 7/18/2024 3289 by Constantine Omer RN  Equipment Currently Used at Home: none   Goal Outcome Evaluation:      Plan of Care Reviewed With: patient, spouse    Overall Patient Progress: improvingOverall Patient Progress: improving           "

## 2024-07-18 NOTE — ADDENDUM NOTE
Addendum  created 07/18/24 1307 by Jaylin Hendricks    Child order released for a procedure order, Clinical Note Signed, Intraprocedure Blocks edited, Intraprocedure Meds edited, SmartForm saved

## 2024-07-18 NOTE — PROVIDER NOTIFICATION
07/18/24 174   Provider Notification   Provider Name/Title Dr. Watters   Method of Notification Phone   Notification Reason Status Update     Spoke with Dr. Watters over the phone regarding patient's incision draining under the tegaderm - bright red in color and moderate in amount. It is contained under the tegaderm. Per Dr. Watters verbal orders, remove tegaderm dressing and replace with a new tegaderm dressing with a pressure dressing overtop of that. Charge RN josey Mccray RN and writer at bedside performing sterile dressing change per MD verbal orders.

## 2024-07-18 NOTE — PLAN OF CARE
Goal Outcome Evaluation:      Plan of Care Reviewed With: patient    Overall Patient Progress: improvingOverall Patient Progress: improving    Outcome Evaluation: C/S delivery at 0754. Patient states pain is manageable, Dilaudid IVP given as well as ice pack applied to abdomen. VSS.

## 2024-07-18 NOTE — ANESTHESIA PREPROCEDURE EVALUATION
"Anesthesia Pre-Procedure Evaluation    Patient: Anne Alonso   MRN: [id number] : 1990        Procedure : Procedure(s):  REPEAT  SECTION          History reviewed. No pertinent past medical history.   Past Surgical History:   Procedure Laterality Date     SECTION      x3     SECTION N/A 2023    Procedure: REPEAT  SECTION;  Surgeon: Pauline Wilde MD;  Location:  L+D      No Known Allergies   Social History     Tobacco Use    Smoking status: Never    Smokeless tobacco: Not on file   Substance Use Topics    Alcohol use: Never      Wt Readings from Last 1 Encounters:   24 58.5 kg (129 lb)        Anesthesia Evaluation            ROS/MED HX  ENT/Pulmonary:    (-) asthma, COPD and sleep apnea   Neurologic:    (-) no seizures and no CVA   Cardiovascular:  - neg cardiovascular ROS     METS/Exercise Tolerance:     Hematologic:     (+)      anemia,          Musculoskeletal:       GI/Hepatic: Comment: Ulcerative colitis     (+) GERD,                (-) liver disease   Renal/Genitourinary:    (-) renal disease   Endo:       Psychiatric/Substance Use:       Infectious Disease:       Malignancy:       Other:            Physical Exam    Airway        Mallampati: II   TM distance: > 3 FB   Neck ROM: full     Respiratory Devices and Support         Dental       (+) Minor Abnormalities - some fillings, tiny chips      Cardiovascular   cardiovascular exam normal          Pulmonary   pulmonary exam normal                OUTSIDE LABS:  CBC:   Lab Results   Component Value Date    WBC 5.5 2022    HGB 11.6 (L) 2024    HGB 10.7 (L) 02/10/2023    HCT 31.8 (L) 2022     2022     BMP:   Lab Results   Component Value Date     2022    POTASSIUM 3.4 2022    CHLORIDE 109 2022    CO2 21 2022    BUN 16 2022    CR 0.36 (L) 2022    GLC 97 2022     COAGS: No results found for: \"PTT\", \"INR\", \"FIBR\"  POC:   Lab Results "   Component Value Date    HCGS Negative 06/25/2022     HEPATIC:   Lab Results   Component Value Date    ALBUMIN 3.3 (L) 06/25/2022    PROTTOTAL 7.1 06/25/2022    ALT 20 06/25/2022    AST 10 06/25/2022    ALKPHOS 46 06/25/2022    BILITOTAL 0.4 06/25/2022     OTHER:   Lab Results   Component Value Date    STANLEY 8.8 06/25/2022    LIPASE 118 06/25/2022       Anesthesia Plan    ASA Status:  2       Anesthesia Type: Spinal.              Consents    Anesthesia Plan(s) and associated risks, benefits, and realistic alternatives discussed. Questions answered and patient/representative(s) expressed understanding.     - Discussed:     - Discussed with:  Patient,             Postoperative Care       PONV prophylaxis: Ondansetron (or other 5HT-3)     Comments:               aJylin Hendricks    I have reviewed the pertinent notes and labs in the chart from the past 30 days and (re)examined the patient.  Any updates or changes from those notes are reflected in this note.

## 2024-07-18 NOTE — PROGRESS NOTES
Data: Patient admitted to room Mac 2 at 530. Patient is a . Prenatal record reviewed.   OB History    Para Term  AB Living   7 4 4 0 2 4   SAB IAB Ectopic Multiple Live Births   2 0 0 0 1      # Outcome Date GA Lbr Ricardo/2nd Weight Sex Type Anes PTL Lv   7 Current            6 Term 23 39w4d  3.22 kg (7 lb 1.6 oz) M    STELLA      Name: JERICA,MALE-FREDDY      Apgar1: 8  Apgar5: 9   5 SAB            4 SAB            3 Term            2 Term            1 Term            .  Medical History: History reviewed. No pertinent past medical history..  Gestational age 39w0d. Vital signs per doc flowsheet. Fetal movement present. Patient reports Scheduled  Section (Denies LOF or bleeding. Baby moving as normal)   as reason for admission. Support persons are present.  Action: In person  services used. Verbal consent for EFM, external fetal monitors applied. Admission assessment completed. Patient and support persons educated on labor process. Patient instructed to report change in fetal movement, contractions, vaginal leaking of fluid or bleeding, abdominal pain, or any concerns related to the pregnancy to her nurse/physician. Patient oriented to room, call light in reach.   Response: Patient verbalized understanding of education and verbalized agreement with plan.

## 2024-07-18 NOTE — PROVIDER NOTIFICATION
07/18/24 1714   Provider Notification   Provider Name/Title Dr. Wilde   Method of Notification Electronic Page   Notification Reason Status Update     Paged MD, no return call. Different doctor is now on-call, will page.

## 2024-07-18 NOTE — ANESTHESIA PROCEDURE NOTES
"Intrathecal injection Procedure Note    Pre-Procedure   Staff -        Anesthesiologist:  Jaylin Hendricks       Performed By: anesthesiologist       Location: OR       Pre-Anesthestic Checklist: patient identified, IV checked, risks and benefits discussed, informed consent, monitors and equipment checked and pre-op evaluation  Timeout:       Correct Patient: Yes        Correct Procedure: Yes        Correct Site: Yes        Correct Position: Yes   Procedure Documentation  Procedure: intrathecal injection       Patient Position: sitting       Patient Prep/Sterile Barriers: sterile gloves, mask, patient draped       Skin prep: Betadine       Insertion Site: L3-4. (midline approach).       Needle Gauge: 25.        Needle Length (Inches): 3.5        Spinal Needle Type: Pencan       Introducer used       Introducer: 20 G       # of attempts: 1 and  # of redirects:  0    Assessment/Narrative         Paresthesias: No.       CSF fluid: clear.    Medication(s) Administered   0.75% Hyperbaric Bupivacaine (Intrathecal) - Intrathecal   1.6 mL - 7/18/2024 7:30:00 AM  Morphine PF 1 mg/mL (Intrathecal) - Intrathecal   0.15 mg - 7/18/2024 7:30:00 AM   Comments:  EZ injection of 0.75% Marcaine - no paresthesias on injection or s/s IV.   Immediately to supine.   Pt tolerated well.    No complications.       FOR [facility] ([facility]) ONLY:   Pain Team Contact information: please page the Pain Team Via [employer]. Search \"Pain\". During daytime hours, please page the attending first. At night please page the resident first.      "

## 2024-07-18 NOTE — PROCEDURES
Delivery Note    Surgeons and Role:     * Pauline Wilde MD - Primary    Assistant: NAHOMI Maldonado    Preoperative Diagnosis:  Intrauterine pregnancy at 39w0d (DIANA: 2024, by Patient Reported)     Previous   Postoperative Diagnosis:  Same         Name of Operation: Repeat Low Transverse  Section via Pfannensteil      FINDINGS:   Viable male infant, in Cephalic presentation. APGARS were 8 / 9  at 1 and 5 minutes respectively. Placenta with central 3 vessel cord. Normal uterus, bilateral tubes and ovaries.     QBL: Delivery QBL (mL): 275    Infant Birth Weight: 3.26 kg (7 lb 3 oz)     Informed Consent:  The risks, benefits, complications, and alternatives were discussed with the patient. The patient understood that the risks of  section include, but are not limited to: injury to nearby structures or organs, infection, blood loss and possible need for transfusion, and potential need for additional procedures. The patient stated understanding and desired to proceed. All questions were answered. The site of surgery was properly noted and marked. The patient was identified as Anne Alonso and the procedure verified as a  delivery. A Time Out was held and the above information confirmed.    Procedure Details:  The patient was taken to the operating room where spinal anesthesia was found to be adequate. She was given 2 grams of Ancef. She was then prepped and draped in the normal sterile fashion in the supine position with a leftward tilt. A Pfannenstiel skin incision was then made with the scalpel and carried through to the underlying layer of fascia. The fascia was incised in the midline and the incision extended laterally bluntly. The rectus muscles were then  in the midline, and the peritoneum was identified and entered bluntly. The peritoneal incision was then extended superiorly and inferiorly with good visualization of the bladder. The bladder blade was  then inserted and the vesicouterine peritoneum identified, the bladder was clearly deliniated away from the proposed hysteromtomy and decision was made to proceed without a bladder flap.    A low transverse hysterotomy was made with the scalpel. The uterine incision was then extended bluntly using traction in the cephalocaudal direction. The bladder blade was removed and the fetal vertex was then elevated out of the pelvis and was delivered through the incision using gentle fundal pressure. The cord was clamped and cut and the infant was transferred to the warmer and attended to by the nursing team.    The placenta was then delivered using external massage. The uterus was exteriorized and cleared of all clots and debris. The uterine incision was repaired with 0-monocryl in a running, locked fashion. A second imbricating layer of the same suture was used to obtain excellent hemostasis. Additional figure of 8 sutures were used to accomplish hemostasis.Good hemostasis was noted after hysterotomy closure.    The uterus was returned to the abdomen and the gutters were cleared of all clots and debris. The hysterotomy was examined in situ and hemostasis was satisfactory. The ventral aspect of the fascia was inspected and no fascial defects were found. The rectus muscles were inspected and found to be intact and hemostatic The fascia was reapproximated with 0-vicryl in a running fashion. The subcutaneous tissue was then irrigated and the bovie was used to obtain excellent hemostasis.  The skin was closed with absorbable staples.  The patient tolerated the procedure well and was taken to the recovery room in stable condition.    Complications: None  Sponge/Instrument/Needle Counts: The sponge, lap and needle counts were correct x3.       Pauline Wilde MD

## 2024-07-18 NOTE — ANESTHESIA CARE TRANSFER NOTE
Patient: Anne Alonso    Procedure: Procedure(s):  REPEAT  SECTION       Diagnosis: Maternal care due to low transverse uterine scar from previous  delivery [O34.211]  Diagnosis Additional Information: No value filed.    Anesthesia Type:   General     Note:    Oropharynx: oropharynx clear of all foreign objects and spontaneously breathing  Level of Consciousness: awake  Oxygen Supplementation: room air    Independent Airway: airway patency satisfactory and stable  Dentition: dentition unchanged  Vital Signs Stable: post-procedure vital signs reviewed and stable  Report to RN Given: handoff report given  Patient transferred to: Labor and Delivery  Comments: Transferred to OB PACU recovery, spontaneous respirations on room air with oxygen saturations maintained greater than 98%. SpO2, NiBP, and EKG monitors and alarms on and functioning, report on patient's clinical status given to OB recovery RN, RN questions answered, patient in hospital cart with siderails up Oxytocin 30 units in 500mL infusion connected to IV infusion pump in recovery bay and programmed to 340 mL/hr at handoff of care.        Handoff Report: Identifed the Patient, Identified the Reponsible Provider, Reviewed the pertinent medical history, Discussed the surgical course, Reviewed Intra-OP anesthesia mangement and issues during anesthesia, Set expectations for post-procedure period and Allowed opportunity for questions and acknowledgement of understanding  Vitals:  Vitals Value Taken Time   BP     Temp     Pulse     Resp     SpO2         Electronically Signed By: SALAZAR Stanley CRNA  2024  8:26 AM

## 2024-07-18 NOTE — PROVIDER NOTIFICATION
07/18/24 1530   Provider Notification   Provider Name/Title Dr. Wilde   Method of Notification Electronic Page   Notification Reason Status Update     Dr. Wilde paged regarding pierson removal orders. Per  BAUDILIO Michelle who writer received report from - Viviana stood patient approximately 1 hour ago and patient became dizzy after a couple steps and was brought back to cart safely. Dr. Wilde states okay to keep pierson in place until patient is no longer dizzy and able to ambulate.

## 2024-07-18 NOTE — PLAN OF CARE
Pt. admitted from L&D via bed.. Pt. arrived with baby and was accompanied by  and arrived with personal belongings at 1035. Report was taken from Dejah in L&D.  Pt. Oriented to room and call light system. Pt. is A&Ox3 and VSS on RA. Fundus is firm and midline.  Vaginal bleeding is light, small trickle with a few checks, no clots.  Pt. c/o pain 3-6/10 pain. Had Tylenol, Toradol and Dilaudid. Pt. denies nausea, CP, SOB, lightheadedness, or dizziness. LS clear and BS active. PIV patent and infusing.  Jarrell patent and draining.  Tegaderm dressing to lower abdomen with small amt. sang. drainage under.  Pneumoboots in place to BLE.  Stood pt. at bedside and attempted to amb. to BR, pt. dizzy. Back to bed and ivonne care done, gown changed. Breastfeeding baby boy who is feeding well, suppl. with Similac.   Goal Outcome Evaluation:      Plan of Care Reviewed With: patient    Overall Patient Progress: improvingOverall Patient Progress: improving

## 2024-07-18 NOTE — ANESTHESIA POSTPROCEDURE EVALUATION
Patient: Anne Alonso    Procedure: Procedure(s):  REPEAT  SECTION       Anesthesia Type:  Spinal    Note:  Disposition: Admission   Postop Pain Control: Uneventful            Sign Out: Well controlled pain   PONV: No   Neuro/Psych: Uneventful            Sign Out: Acceptable/Baseline neuro status   Airway/Respiratory: Uneventful            Sign Out: Acceptable/Baseline resp. status   CV/Hemodynamics: Uneventful            Sign Out: Acceptable CV status   Other NRE:    DID A NON-ROUTINE EVENT OCCUR? No           Last vitals:  Vitals Value Taken Time   BP 99/65 24 1015   Temp 37.2  C (99  F) 24 1015   Pulse 69 24 1015   Resp 14 24 1015   SpO2 99 % 24 1017   Vitals shown include unfiled device data.    Electronically Signed By: Jaylin Hendricks  2024  1:03 PM

## 2024-07-19 LAB — HGB BLD-MCNC: 9.6 G/DL (ref 11.7–15.7)

## 2024-07-19 PROCEDURE — 250N000013 HC RX MED GY IP 250 OP 250 PS 637: Performed by: OBSTETRICS & GYNECOLOGY

## 2024-07-19 PROCEDURE — 36415 COLL VENOUS BLD VENIPUNCTURE: CPT | Performed by: OBSTETRICS & GYNECOLOGY

## 2024-07-19 PROCEDURE — 250N000011 HC RX IP 250 OP 636: Performed by: OBSTETRICS & GYNECOLOGY

## 2024-07-19 PROCEDURE — 120N000012 HC R&B POSTPARTUM

## 2024-07-19 PROCEDURE — 85018 HEMOGLOBIN: CPT | Performed by: OBSTETRICS & GYNECOLOGY

## 2024-07-19 RX ADMIN — ACETAMINOPHEN 975 MG: 325 TABLET, FILM COATED ORAL at 09:04

## 2024-07-19 RX ADMIN — KETOROLAC TROMETHAMINE 30 MG: 30 INJECTION, SOLUTION INTRAMUSCULAR at 04:00

## 2024-07-19 RX ADMIN — ACETAMINOPHEN 975 MG: 325 TABLET, FILM COATED ORAL at 04:00

## 2024-07-19 RX ADMIN — SENNOSIDES AND DOCUSATE SODIUM 2 TABLET: 50; 8.6 TABLET ORAL at 09:04

## 2024-07-19 RX ADMIN — OXYCODONE HYDROCHLORIDE 5 MG: 5 TABLET ORAL at 11:38

## 2024-07-19 RX ADMIN — IBUPROFEN 800 MG: 400 TABLET, FILM COATED ORAL at 21:18

## 2024-07-19 RX ADMIN — ACETAMINOPHEN 975 MG: 325 TABLET, FILM COATED ORAL at 15:36

## 2024-07-19 RX ADMIN — SENNOSIDES AND DOCUSATE SODIUM 1 TABLET: 50; 8.6 TABLET ORAL at 21:19

## 2024-07-19 RX ADMIN — IBUPROFEN 800 MG: 400 TABLET, FILM COATED ORAL at 09:05

## 2024-07-19 RX ADMIN — ACETAMINOPHEN 975 MG: 325 TABLET, FILM COATED ORAL at 21:18

## 2024-07-19 RX ADMIN — OXYCODONE HYDROCHLORIDE 5 MG: 5 TABLET ORAL at 23:41

## 2024-07-19 RX ADMIN — IBUPROFEN 800 MG: 400 TABLET, FILM COATED ORAL at 15:37

## 2024-07-19 ASSESSMENT — ACTIVITIES OF DAILY LIVING (ADL)
ADLS_ACUITY_SCORE: 22
ADLS_ACUITY_SCORE: 23
ADLS_ACUITY_SCORE: 22
ADLS_ACUITY_SCORE: 22
ADLS_ACUITY_SCORE: 23
ADLS_ACUITY_SCORE: 22
ADLS_ACUITY_SCORE: 23
ADLS_ACUITY_SCORE: 22
ADLS_ACUITY_SCORE: 22
ADLS_ACUITY_SCORE: 23
ADLS_ACUITY_SCORE: 22

## 2024-07-19 NOTE — PLAN OF CARE
Goal Outcome Evaluation:      Plan of Care Reviewed With: patient    Overall Patient Progress: improvingOverall Patient Progress: improving     Vital signs stable. Postpartum assessment WDL. Incision dressing was changed earlier in the night per MD orders and now has a pressure dressing on - see previous note. Pain controlled with tylenol and toradol. Patient dizzy with ambulation attempts during the night, MD paged - MD verbal order for pierson to remain in place until patient no longer dizzy and able to ambulate. Early this morning, patient ambulated without any c/o dizziness - Pierson catheter removed at 0600, DTV. Breastfeeding on cue with minimal assist. Patient and infant bonding well. Will continue with current plan of care.

## 2024-07-19 NOTE — PLAN OF CARE
Vital signs stable. Postpartum assessment WDL. Incision WDL, covered with pressure dressing and CDI. Pain controlled with APAP/ibuprofen/ice/oxycodone. Patient ambulating with no assist. Patient reports passing gas. Breastfeeding on cue with no assist. Intermittently supplements with formula. Patient and infant bonding well. Will continue with current plan of care.

## 2024-07-19 NOTE — PROGRESS NOTES
[facility]   Obstetrics Post-Op / Progress Note         Interval History:     Doing well.  Pain is well-controlled.  Ambulatory. Jarrell is out, not yet voided. Breastfeeding well. Lochia within normal limits, denies clots.              Physical Exam:   All vitals stable  Temp: 97.9  F (36.6  C) Temp src: Oral BP: 91/58 Pulse: 75   Resp: 16 SpO2: 99 % O2 Device: None (Room air)      Constitutional: healthy, alert, no distress.   Abdomen: Abdomen soft, non-tender. BS normal. No masses, fundus is firm.  Incision: Clean, dry and pressure dressing in place  Extremities: minimal edema            Data:   All laboratory data related to this surgery reviewed  Lab Results   Component Value Date    HGB 11.6 (L) 2024            Assessment and Plan:    Assessment:   Post-operative day #1  Low transverse repeat  section         Doing well.      Plan:   Ambulation encouraged  Pain control: acetaminophen and ibuprofen PRN  Diet as tolerated  Activity as tolerated  Continue cares  Dispo: anticipate discharge home in 1-2 days.         Carol Rodrigues PA-C

## 2024-07-20 VITALS
OXYGEN SATURATION: 99 % | HEART RATE: 85 BPM | SYSTOLIC BLOOD PRESSURE: 101 MMHG | BODY MASS INDEX: 24.19 KG/M2 | DIASTOLIC BLOOD PRESSURE: 67 MMHG | WEIGHT: 120 LBS | HEIGHT: 59 IN | RESPIRATION RATE: 16 BRPM | TEMPERATURE: 98 F

## 2024-07-20 PROCEDURE — 250N000013 HC RX MED GY IP 250 OP 250 PS 637: Performed by: OBSTETRICS & GYNECOLOGY

## 2024-07-20 RX ORDER — OXYCODONE HYDROCHLORIDE 5 MG/1
5 TABLET ORAL EVERY 4 HOURS PRN
Qty: 12 TABLET | Refills: 0 | Status: SHIPPED | OUTPATIENT
Start: 2024-07-20

## 2024-07-20 RX ORDER — IBUPROFEN 200 MG
200 TABLET ORAL EVERY 6 HOURS PRN
Status: CANCELLED | OUTPATIENT
Start: 2024-07-20

## 2024-07-20 RX ORDER — AMOXICILLIN 250 MG
1 CAPSULE ORAL 2 TIMES DAILY
Qty: 30 TABLET | Refills: 0 | Status: SHIPPED | OUTPATIENT
Start: 2024-07-20

## 2024-07-20 RX ORDER — ACETAMINOPHEN 650 MG/1
650 SUPPOSITORY RECTAL EVERY 4 HOURS PRN
Status: CANCELLED | OUTPATIENT
Start: 2024-07-20

## 2024-07-20 RX ORDER — SENNOSIDES 8.6 MG
8.6 TABLET ORAL 2 TIMES DAILY PRN
Status: CANCELLED | OUTPATIENT
Start: 2024-07-20

## 2024-07-20 RX ORDER — ACETAMINOPHEN 325 MG/1
650 TABLET ORAL EVERY 4 HOURS PRN
Status: CANCELLED | OUTPATIENT
Start: 2024-07-20

## 2024-07-20 RX ORDER — IBUPROFEN 600 MG/1
600 TABLET, FILM COATED ORAL EVERY 6 HOURS PRN
Qty: 60 TABLET | Refills: 0 | Status: SHIPPED | OUTPATIENT
Start: 2024-07-20

## 2024-07-20 RX ORDER — ACETAMINOPHEN 325 MG/1
650 TABLET ORAL EVERY 4 HOURS PRN
Qty: 60 TABLET | Refills: 0 | Status: SHIPPED | OUTPATIENT
Start: 2024-07-20

## 2024-07-20 RX ADMIN — IBUPROFEN 800 MG: 400 TABLET, FILM COATED ORAL at 15:50

## 2024-07-20 RX ADMIN — OXYCODONE HYDROCHLORIDE 5 MG: 5 TABLET ORAL at 07:46

## 2024-07-20 RX ADMIN — OXYCODONE HYDROCHLORIDE 5 MG: 5 TABLET ORAL at 13:02

## 2024-07-20 RX ADMIN — SENNOSIDES AND DOCUSATE SODIUM 1 TABLET: 50; 8.6 TABLET ORAL at 07:46

## 2024-07-20 RX ADMIN — ACETAMINOPHEN 975 MG: 325 TABLET, FILM COATED ORAL at 15:50

## 2024-07-20 RX ADMIN — ACETAMINOPHEN 975 MG: 325 TABLET, FILM COATED ORAL at 10:28

## 2024-07-20 RX ADMIN — ACETAMINOPHEN 975 MG: 325 TABLET, FILM COATED ORAL at 04:11

## 2024-07-20 RX ADMIN — IBUPROFEN 800 MG: 400 TABLET, FILM COATED ORAL at 04:11

## 2024-07-20 RX ADMIN — IBUPROFEN 800 MG: 400 TABLET, FILM COATED ORAL at 10:28

## 2024-07-20 ASSESSMENT — ACTIVITIES OF DAILY LIVING (ADL)
ADLS_ACUITY_SCORE: 22
ADLS_ACUITY_SCORE: 18
ADLS_ACUITY_SCORE: 22
ADLS_ACUITY_SCORE: 18
ADLS_ACUITY_SCORE: 18
ADLS_ACUITY_SCORE: 22
ADLS_ACUITY_SCORE: 18
ADLS_ACUITY_SCORE: 22
ADLS_ACUITY_SCORE: 18

## 2024-07-20 NOTE — PROVIDER NOTIFICATION
Dr Wilde paged for orders for outpatient tylenol, ibuprofen and senna per pt request. Awaiting return call/new orders.

## 2024-07-20 NOTE — PLAN OF CARE
Goal Outcome Evaluation:      Plan of Care Reviewed With: patient    Overall Patient Progress: improving Overall Patient Progress: improving    Outcome Evaluation: Discharge home    Anne is POD 2 from csection with Dr Wilde. AVSS. Fundus firm without massage. Scant rubra lochia. Vdg adequately. +BM. Pain well controlled with scheduled tylenol, ibuprofen and as needed oxycodone. Incision dressing removed and steristrips applied today, not approximated in 2 small spots, improving with steristrips. Moist sanguineous drainage improving. Discharge orders received. Reviewed with pt. In agreement to discharge to home. Awaiting ride from spouse.

## 2024-07-20 NOTE — LACTATION NOTE
Pt requests Medela breast pump for discharge. Pt reports she received a Spectra breast pump 17 months ago, but lost it before she could use it. Instructed patient to call insurance to verify coverage before opening this breast pump. If unopened, it can be returned to DME locations listed on handout. Patient verbalizes understanding.

## 2024-07-20 NOTE — PLAN OF CARE
Goal Outcome Evaluation:      Plan of Care Reviewed With: patient    Overall Patient Progress: improving    VSS on RA.  Fundus firm, midline, U/U.  Scant lochia rubra.  Incision WDL.  Pt is able to empty bladder independently. Voiding adequately.  Up independently.  Pain managed w/Tylenol, Ibuprofen, and Oxycodone.  Pt breastfeeding and supplementing with formula.  Bonding well w/infant.  Independent w/infant cares.  Spouse at bedside.  Nursing to continue to monitor.

## 2024-07-20 NOTE — PROVIDER NOTIFICATION
"Anne called writer in regarding concern for incision with 2 white tissue spots and drainage. Pressure dressing was removed in the shower and tegaderm firm dressing intact with moist sanguineous drainage. RN called Dr Wilde to discuss plan. Per MD, remove dressing and apply steristrips 1/4\" to 1/2\" apart to allow for drainage if needed. Leave incision SHAMA. Call with any further concerns.   "

## 2024-07-20 NOTE — PLAN OF CARE
VSS, on room air. Taking tylenol and ibuprofen for pain control. Incision is covered with pressure dressing TBD when that is to be removed. Up ad bozena. Breastfeeding baby independently.

## 2024-07-20 NOTE — DISCHARGE SUMMARY
[facility]   Obstetrics Progress Note    Subjective: This is the patient's second day since  delivery. She is doing well.  She is urinating on her own. Pain is controlled with medication.    Objective:   All vitals stable  Temp: 98  F (36.7  C) Temp src: Oral BP: 101/67 Pulse: 85   Resp: 16   O2 Device: None (Room air)      EXAM:  Constitutional: healthy, alert, no distress.   Abdomen: Abdomen soft, non-tender. BS normal. No masses, fundus is firm.  JOINT/EXTREMITIES: extremities normal   Incision: dressed with pressure dressing    Last hemoglobin was   Hemoglobin   Date Value Ref Range Status   2024 9.6 (L) 11.7 - 15.7 g/dL Final   ]    Assessment: Stable postpartum course.    Plan: Routine care. Ambulation encouraged  Breast feeding strategies discussed  Pain control measures as needed  Reportable signs and symptoms dicussed with the patient  Discharge later today    Pauline Wilde MD

## 2024-07-20 NOTE — DISCHARGE INSTRUCTIONS
Warning Signs after Having a Baby    Keep this paper on your fridge or somewhere else where you can see it.    Call your provider if you have any of these symptoms up to 12 weeks after having your baby.    Thoughts of hurting yourself or your baby  Pain in your chest or trouble breathing  Severe headache not helped by pain medicine  Eyesight concerns (blurry vision, seeing spots or flashes of light, other changes to eyesight)  Fainting, shaking or other signs of a seizure    Call 9-1-1 if you feel that it is an emergency.     The symptoms below can happen to anyone after giving birth. They can be very serious. Call your provider if you have any of these warning signs.    My provider s phone number: _______________________    Losing too much blood (hemorrhage)    Call your provider if you soak through a pad in less than an hour or pass blood clots bigger than a golf ball. These may be signs that you are bleeding too much.    Blood clots in the legs or lungs    After you give birth, your body naturally clots its blood to help prevent blood loss. Sometimes this increased clotting can happen in other areas of the body, like the legs or lungs. This can block your blood flow and be very dangerous.     Call your provider if you:  Have a red, swollen spot on the back of your leg that is warm or painful when you touch it.   Are coughing up blood.     Infection    Call your provider if you have any of these symptoms:  Fever of 100.4 F (38 C) or higher.  Pain or redness around your stitches if you had an incision.   Any yellow, white, or green fluid coming from places where you had stitches or surgery.    Mood Problems (postpartum depression)    Many people feel sad or have mood changes after having a baby. But for some people, these mood swings are worse.     Call your provider right away if you feel so anxious or nervous that you can't care for yourself or your baby.    Preeclampsia (high blood pressure)    Even if you  didn't have high blood pressure when you were pregnant, you are at risk for the high blood pressure disease called preeclampsia. This risk can last up to 12 weeks after giving birth.     Call your provider if you have:   Pain on your right side under your rib cage  Sudden swelling in the hands and face    Remember: You know your body. If something doesn't feel right, get medical help.     For informational purposes only. Not to replace the advice of your health care provider. Copyright 2020 [facility]. All rights reserved. Clinically reviewed by Margoth Grande, RNC-OB, MSN. Flat.to [id number] - Rev .    Sushila llanes saaro: Waxa la filayo xaga Guriga   Section: What to Expect at Home  [facility] Centergenesis  Jacoba julian hillu cyrus saarayo ama Tremaineinka C, waa qaliin lagaga aleaho ilmaha juliano weber piter dhkhatarku ku sameeyo calooshaada hoose iyo ronak galeenka.  Waxaad ku lahaan kartaa qayb xanuun calooshaada hoose oo waxaad u baahan kartaa dawada xanuunka ee 1 ilaa 2 todobaad. Waxaad filan kartaa qayb dhiig baxa siilka ah ilaa dhowr todobaad. To gala haan waxaad u baahan kartaa ilaa 6 todobaad inaad si buuxda u bogsato.  Carter obrien in la daysto marka jarida ay bogsanayso. Iska ilaali qaadista waxa culus, hawlaha adag, ama jimicsiyada giijiya muruqyada caloosha marka aad bogsanayso. Waydiiso xubin qoyska ah ama saaxiib inuu kaa caawiyo shaqada guriga, wax karinta, iyo wax iibsiga.  Warqadan daryeelku waxay ku siinaysaa fikrad guud oo ku saabsan inta ay qaadanayso inaad cyrus kabsato. Laakiin qof walba wuxuu usoo kabsadaa xowli camila hectorn. Raac talaavicki hoos ku qoran si aad u dareento ladnaan sida ugu [facility] Hospitalsa badan.  Sideed ugu daryeeshaa naftaada xaga guriga?  Hawsha  Naso marka aad daal dareento. Hurdo kugu filan oo aad hesho baa kugu caawin doonta in aad ladnaato.  Isku day inaa socoto maalin kasta. Ku bilow inaad socotid in [facility] Cent[facility]ar ka dylonan intaa hore  u samaysay. In regla in regla, u kordhi cadada socodka aad socoto. Socodku waxa uu korfhiyaa qulqulka dhiiga oo waxay ka caawisaa ka hortagga peter wareenka, calool istaaga, iyo xinjiroga dhiiga.  Iska ilaali hawlaha adag, sida raacida baaskiilka, guclaynta, miisaan qaadiska, iyo jimicsiga eerobaatiga, ilaa 6 todobaad ama ilaa dhakhtarkaagu uu yidhaahdo waa sax.  Ilaa dhakhtarkaagu yidhaahdo waa sax, diaz qaadin wax ka culus ilmahaaga.  Diaz samayn jimicsiga jiif sare u cyrus kaca ee cadaadiya muruqyada calooshaada ilaa 6 todobaad ama ilaa dhakhtarkaagu sidaas yidhaahdo waa sax.  Ku hay barkimo halka lagaa qalay marka aad qufacdo ama qaadato neef qoto dheer. Stefan waxay taageeri doonaa calooshaada oo yarayn doontaa xanuunkaaga.  Waxaad u qubaysan kartaa sida caadiga ah. Qalaji meesha qaliinka arkaad dhammaysto.  Waxaad lahaan doontaa qayb dhiig bax siilka ah. Xidho xafaayadaha dumarka. Biyo ha isku shubin ama ha isticmaalin xafaayada regla ilaa dhakhtarkaagu uu yidhaahdo waa sax.  Waydii dhakhtarkaaga marka aad wadi karto baabuurka.  maangal ahaan waxaad u baahab doontaa inaad qaadato ugu yaraan 6 todobaad oo shaqo ah. Waxay ku xidhan tahay nooca shaqada aad qabto iyo sida aad dareeno.  Dhakhtarkaaga waydii markay issa tahay inaad galmo samayso.  Cuntada (Diet)  Waxaad cuni kartaa cuntadaada caadiga ah. Haddii calooshaada ay kacsan tahay, isku day cunto aan aad u xiiso badnayn, duxdu ku yartahay sida bariis cad, digaag la dubay, rooti, iyo caano fadhi.  Cab biyo badan (ilaa dhakhtarkaagu uu kuu sheego inaadan samayn)  Waxaad ogsoonaan kartaa dhaqaaqa xidmaha inaanay caadi ahayn ka jero qaliinkaaga. Stefan waa mid guud Isku day inaad iska ilaaliso calool istaaga iyo xanibka dhaqaaqa xidmaha. Waxaa laga yabaa inaad qaadato cuntada kaabbida liifka leh maalin kasta. Haddii aanad lahayn socodka caliisha ka jero labba maalmood, waydii dhakhtarkaaga wax ku saabsan qaadashada dawada calool socod siinta ood egen.  Haddii aad naas nuuPsychiatric hospitalayso,  xadid khamrada. Khamrada waxay sababi kartaa issa la'aan iyo dhibaatooyinka kale ee caafimaadka ee ilmaha marka la naas nuujinayo haweenka ay cabbaan in [facility]. Waxay sidoo kale wesley kartaa jidka kartida hooyada si loo quudiyo ilmahayga ama in loo daryeelo ilmaha qaabab kale. Ma jiran cilmi baadhid wayn oo ku saabsan dhab ahaan inta dhab ahaan waxyeelayn karta ilmahaaga. Iyaddoon khamro la cabin waa doorashada ugu amniga badan ee ilmahaaga. haddii aad doorato inaad wax cabto kolba, cab arelis cabbitaan, oo xadid tirada wakhtiyada aad wax cabtay. Sug inaad naas nuujiso ugu yaraan 2 saacadood, ka jero marka aad cabto si aad u yarayso qadarka khamrada ilmaha u raacaysa caanaha.  Dawooyinka  Dhakhtarkaaga ayaa kuu sheegi doona marka aad jero u bilaabi karto daawooyinka. Isaga ama iyadu waxay sidoo kale ku siin doonaan tilmaamo ku saabsan wixii daawooyin cusub ah iyo qaadashadooda.  Haddii aad qaadato daawada dhiiga jilcisa, sida warfarin (Coumadin), clopidogrel (Plavix), ama aasbariinka, hubso inaad dhakhtarkaaga la hadasho. Isaga ama iyada ayaa kuu sheegi doona haddii iyo goorta aad bilaabayso qaadashada daawooyinka. Xaqiiqso inaad fahamsan tahay dhabahaan waxa uu dhakhtarkaagu rabo in aad samaysid.  Uqaado daawada xanuunka baabi'sa dhab ahaan sidii laguu cyrus faray.  Haddii dhakhtarkaagu ku cyrus siiyay daawada xanuunka baabi'sa u qaado sida uu ku cyrus faray.  Haddii aadan qaadanayn daawada xanuunka baabi'sa, waydii dhakhtarkaaga inaad qaadan karto mida farmashiyaha laga cyrus iibsado.  Haddii aadu malaynayso dawadaada xanuunka inay calooshaada bukaysiinayso.  Qaado dawadaada ka jero cuntooyinka (iyaddoo dhakhtarkaagu uu kuu sheego inaadan samayn mooyaane).  Waydii dhakhtarkaaga dawada xanuunka camila duwan.  Haddii jennifertarkaagu kuu qoray antibooyatik, u qaado sida lagu faray. Diaz joojin inaad la hadasho sababtoo ah waad ladnaatay. Waxaad u baahan tahay inaad qaadato dawo badan oo antibooyatiga.  Kathryn meesha qaliinka  Haddii  aad ku leedahay liidad lagu dhejiyay ama xabag meesha jariinka, kudaa xabagta ilaa todobaad ama ilaa ay ka cyrus dhacdo.  Ku dhaq meesha maalin kasta biyo diiran, saabuun leh, oo qalaji. Ha isticmaalin hydrogen peroxide ama khamri, taasoo inaad buskooto yarayn karta. Waxaad ku dabooli kartaa aaga baandheejka gooska ah haddii dheecaan ka yimaado ama ay ku xoqonto dharka. Beddel baandheejka maalin kasta.  Meesha nadiifi oo qalaji.  Tilmaamaha kale.  Haddii aad naasnuujiso ilmahaaga, waxaad dareemi kartaa raaxomarka aad bogsanayso haddii aad saarto ilmaha markaas isaga ama iyadda ma dul jiifayo calooshaada. Madeline ilmahaaga gacantaada hoosteeda, oo jirkiisa ama jirkeeda aad xijiso dhinaca aad ka quudinayso. Ku taageer gacantaada jirka qaybta sare ee ilmahaagaga. Gacantaas waad ku koontarooli kartaa ilmaha madaxiisa si aad ugu keentid afkiisa ama afkeeda naasahaaga. Tan wakhtiyada qaarkeed waxaa loogu yeedha haynta kubbada cagta.  Lasocoshada-Daryeelka waa qayb muhiim ah oo ka mid ah daaweyntaada iyo badbaadadaada. Hubso in aad sameysato oo aad tagto balamaha dhakhtarka oo isra, waxaadna wacdaa dhakhtarkaaga haddaad dhib qabto. Sidoo kale waa fikrad fiican inaad ogaato natiijayada baaritaannada lagugu sameeyay, iyo inaad kaydsato liiska daawooyinka aad qaadato.  Goormaad wacanaysaa caawimo?  [facility] 911  [facility] Hospitalyohan starkey oo aad is leedahay waxaad u ilia tahaana richardsyeaquiles dagdaantonio vergara. Chase quick, cyrus [facility] haddii:  Waxaad leedahay fikradaha waxyeelaynta naftaada, ilmahaaga, ama qof kale.  Aad suuxday (miyir-[facility]ana).  Waxaad qabtaa xabd xanuuisaac, waa nekorin liriano, ama qufaceysaa dhiig.  Qalal ayaad qabtaa.  [facility] hadda ama raadso daryeel caafimaad haddii:  Aad leedahay xanuun michellen cyrus kandy sanchez markaad qaadatay daawada xaisaacuunosmar bakiley'sa.  Aad leedahay dhiig kasocda siilka.  Wareer ama madax fudayd, ama waxaad dareemaysaa sidii adigoo suuxaya.  Waxaad qabtaa mid cusub ama xanuun osmar reese calooshaada ama  "misigta.  Waxaad waydaa toliinkaaga, ama halkii la jaray ayaa furma.  Aad leedahay calaamado infakshanka sida:  In uu kordho skinny, sindhu, diirimaad, ama gaduudasho.  Xariiqimo gaduudan kasoo baxay agagaarka.  Dheecaan malax oo ka imanaysa agagaarka.  Xumad.  Waxaad leedahay calaamadaha xinjirowga dhiiga ee lugtaada (loogu yeedho xinjirta dhiiga qotoda dheer), sida:  Xanuun kubka, jilibka dhabarkiisa, bowdada ama saraxa.  Guduudni ama barar lugtaada ah ama gumaarka.  Waxaa ku haya calaamadaha preeclampsia, sida:  Barar degdeg ku fuula wajiga, gacmaha ama cagaha.  Dhibaatooyinka araga oo cusub (sida daciifid ama aan saanu u muuqan, ama arag dhibco).  Madax xanuun aad u badan.  Fiiro dhow u yeelo isbeddalka caafimaadkaaga, hubsana inaad dhakhtarkaaga la xiriirto haddii:  Katiana aadan cyrus ladnaan sidii la filayay.  Halkeed ku mani kartaa wax badan?  Tag   [email or web address].[email or web address]  Marilu M806 Gudaha sanduuqa raadinta si loo tara wax badan oo ku saabsan. \"Qaliinka Ilmaha uurka laga saaro: Waxa la filayo xaga Guriga.\"  Hadda laga bilaabo: Bisha Todobaad 10, 2023               Nooca Tusmadda: 14.0    [id number] [facility].   Tilmaamaha mauriceyeelka la qaatay hoosta ruqsada xirfad yaqaankaaga daryeelka akhil Raymondnez macias. [facility] tra knapp.    After Your Delivery (the Postpartum Period): Care Instructions  Overview     After childbirth (postpartum period), your body goes through many changes as you recover. In these weeks after delivery, try to take good care of yourself. Get rest whenever you can and accept help from others.  It may take 4 to 6 weeks to feel like yourself again, and possibly longer if you had a  birth. You may feel sore or very tired as you recover. After delivery, you may continue to " "have contractions as the uterus returns to the size it was before your pregnancy. You will also have some vaginal bleeding. And you may have pain around the vagina as you heal. Several days after delivery you may also have pain and swelling in your breasts as they fill with milk. There are things you can do at home to help ease these discomforts.  After childbirth, it's common to feel emotional. You may feel irritable, cry easily, and feel happy one minute and sad the next. This is called the \"baby blues.\" Hormone changes are one cause of these emotional changes. These feelings usually get better within a couple of weeks. If they don't, talk to your doctor or midwife.  In the first couple of weeks after you give birth, your doctor or midwife may want to check in with you and make a plan for follow-up care. You will likely have a complete postpartum visit in the first 3 months after delivery. At that time, your doctor or midwife will check on your recovery and see how you're doing. But if you have questions or concerns before then, you can always call your doctor or midwife.  Follow-up care is a key part of your treatment and safety. Be sure to make and go to all appointments, and call your doctor if you are having problems. It's also a good idea to know your test results and keep a list of the medicines you take.  How can you care for yourself at home?  Taking care of your body  Use pads instead of tampons for bleeding. After birth, you will have bloody vaginal discharge. You may also pass some blood clots that shouldn't be bigger than an egg. Over the next 6 weeks or so, your bleeding should decrease a little every day and slowly change to a pinkish and then whitish discharge.  For cramps or mild pain, try an over-the-counter pain medicine, such as acetaminophen (Tylenol) or ibuprofen (Advil, Motrin). Read and follow all instructions on the label.  To ease pain around the vagina or from hemorrhoids:  Put ice or a " cold pack on the area for 10 to 20 minutes at a time. Put a thin cloth between the ice and your skin.  Try sitting in a few inches of warm water (sitz bath) when you can or after bowel movements.  Clean yourself with a gentle squeeze of warm water from a bottle instead of wiping with toilet paper.  Use witch hazel or hemorrhoid pads (such as Tucks).  Try using a cold compress for sore and swollen breasts. And wear a supportive bra that fits.  Ease constipation by drinking plenty of fluids and eating high-fiber foods. Ask your doctor or midwife about over-the-counter stool softeners.  Activity  Rest when you can.  Ask for help from family or friends when you need it.  If you can, have another adult in your home for at least 2 or 3 days after birth.  When you feel ready, try to get some exercise every day. For many people, walking is a good choice. Don't do any heavy exercise until your doctor or midwife says it's okay.  Ask your doctor or midwife when it is okay to have vaginal sex.  If you don't want to get pregnant, talk to your doctor or midwife about birth control options. You can get pregnant even before your period returns. Also, you can get pregnant while you are breastfeeding.  Talk to your doctor or midwife if you want to get pregnant again. They can talk to you about when it is safe.  Emotional health  It's normal to have some sadness, anxiety, and mood swings after delivery. It may help to talk with a trusted friend or family member. You can also call the Maternal Mental Health Hotline at 3-577-[facility] (1-580.534.7968) for support. If these mood changes last more than a couple of weeks, talk to your doctor or midwife.  When should you call for help?  Share this information with your partner, family, or a friend. They can help you watch for warning signs.  Call 651  anytime you think you may need emergency care. For example, call if:    You feel you cannot stop from hurting yourself, your baby, or someone  else.     You passed out (lost consciousness).     You have chest pain, are short of breath, or cough up blood.     You have a seizure.   Where to get help 24 hours a day, 7 days a week   If you or someone you know talks about suicide, self-harm, a mental health crisis, a substance use crisis, or any other kind of emotional distress, get help right away. You can:    Call the Suicide and Crisis Lifeline at 988.     Call 0-299-097-TALK (1-824.604.3350).     Text HOME to 209485 to access the Crisis Text Line.   Consider saving these numbers in your phone.  Go to [email or web address] for more information or to chat online.  Call your doctor or midwife now or seek immediate medical care if:    You have signs of hemorrhage (too much bleeding), such as:  Heavy vaginal bleeding. This means that you are soaking through one or more pads in an hour. Or you pass blood clots bigger than an egg.  Feeling dizzy or lightheaded, or you feel like you may faint.  Feeling so tired or weak that you cannot do your usual activities.  A fast or irregular heartbeat.  New or worse belly pain.     You have signs of infection, such as:  A fever.  Increased pain, swelling, warmth, or redness from an incision or wound.  Frequent or painful urination or blood in your urine.  Vaginal discharge that smells bad.  New or worse belly pain.     You have symptoms of a blood clot in your leg (called a deep vein thrombosis), such as:  Pain in the calf, back of the knee, thigh, or groin.  Swelling in the leg or groin.  A color change on the leg or groin. The skin may be reddish or purplish, depending on your usual skin color.     You have signs of preeclampsia, such as:  Sudden swelling of your face, hands, or feet.  New vision problems (such as dimness, blurring, or seeing spots).  A severe headache.     You have signs of heart failure, such as:  New or increased shortness of breath.  New or worse swelling in your legs, ankles, or feet.  Sudden weight gain,  "such as more than 2 to 3 pounds in a day or 5 pounds in a week.  Feeling so tired or weak that you cannot do your usual activities.     You had spinal or epidural pain relief and have:  New or worse back pain.  Increased pain, swelling, warmth, or redness at the injection site.  Tingling, weakness, or numbness in your legs or groin.   Watch closely for changes in your health, and be sure to contact your doctor or midwife if:    Your vaginal bleeding isn't decreasing.     You feel sad, anxious, or hopeless for more than a few days.     You are having problems with your breasts or breastfeeding.   Where can you learn more?  Go to https://www.[employer].net/patiented  Enter A461 in the search box to learn more about \"After Your Delivery (the Postpartum Period): Care Instructions.\"  Current as of: July 10, 2023               Content Version: 14.0    1066-7014 [employer].   Care instructions adapted under license by your healthcare professional. If you have questions about a medical condition or this instruction, always ask your healthcare professional. [employer] disclaims any warranty or liability for your use of this information.      "

## 2024-11-07 ENCOUNTER — TELEPHONE (OUTPATIENT)
Dept: SCHEDULING | Facility: CLINIC | Age: 34
End: 2024-11-07

## 2024-11-07 ENCOUNTER — OFFICE VISIT (OUTPATIENT)
Dept: URGENT CARE | Facility: URGENT CARE | Age: 34
End: 2024-11-07

## 2024-11-07 VITALS
OXYGEN SATURATION: 100 % | WEIGHT: 111.7 LBS | SYSTOLIC BLOOD PRESSURE: 89 MMHG | BODY MASS INDEX: 22.56 KG/M2 | RESPIRATION RATE: 16 BRPM | HEART RATE: 84 BPM | DIASTOLIC BLOOD PRESSURE: 61 MMHG | TEMPERATURE: 98.1 F

## 2024-11-07 DIAGNOSIS — R31.9 URINARY TRACT INFECTION WITH HEMATURIA, SITE UNSPECIFIED: Primary | ICD-10-CM

## 2024-11-07 DIAGNOSIS — B96.89 BV (BACTERIAL VAGINOSIS): ICD-10-CM

## 2024-11-07 DIAGNOSIS — N39.0 URINARY TRACT INFECTION WITH HEMATURIA, SITE UNSPECIFIED: ICD-10-CM

## 2024-11-07 DIAGNOSIS — L29.0 ANAL ITCHING: ICD-10-CM

## 2024-11-07 DIAGNOSIS — R30.0 DYSURIA: Primary | ICD-10-CM

## 2024-11-07 DIAGNOSIS — N39.0 URINARY TRACT INFECTION WITH HEMATURIA, SITE UNSPECIFIED: Primary | ICD-10-CM

## 2024-11-07 DIAGNOSIS — N89.8 VAGINAL ITCHING: ICD-10-CM

## 2024-11-07 DIAGNOSIS — N76.0 BV (BACTERIAL VAGINOSIS): ICD-10-CM

## 2024-11-07 DIAGNOSIS — Z11.3 SCREEN FOR STD (SEXUALLY TRANSMITTED DISEASE): ICD-10-CM

## 2024-11-07 DIAGNOSIS — R31.9 URINARY TRACT INFECTION WITH HEMATURIA, SITE UNSPECIFIED: ICD-10-CM

## 2024-11-07 LAB
ALBUMIN UR-MCNC: >=300 MG/DL
APPEARANCE UR: ABNORMAL
BACTERIA #/AREA URNS HPF: ABNORMAL /HPF
BILIRUB UR QL STRIP: NEGATIVE
CLUE CELLS: PRESENT
COLOR UR AUTO: YELLOW
GLUCOSE UR STRIP-MCNC: NEGATIVE MG/DL
HGB UR QL STRIP: ABNORMAL
KETONES UR STRIP-MCNC: ABNORMAL MG/DL
LEUKOCYTE ESTERASE UR QL STRIP: ABNORMAL
NITRATE UR QL: POSITIVE
PH UR STRIP: 7 [PH] (ref 5–7)
RBC #/AREA URNS AUTO: ABNORMAL /HPF
SP GR UR STRIP: 1.02 (ref 1–1.03)
SQUAMOUS #/AREA URNS AUTO: ABNORMAL /LPF
TRICHOMONAS, WET PREP: ABNORMAL
UROBILINOGEN UR STRIP-ACNC: 2 E.U./DL
WBC #/AREA URNS AUTO: ABNORMAL /HPF
WBC'S/HIGH POWER FIELD, WET PREP: ABNORMAL
YEAST, WET PREP: ABNORMAL

## 2024-11-07 PROCEDURE — 87186 SC STD MICRODIL/AGAR DIL: CPT | Mod: 59 | Performed by: FAMILY MEDICINE

## 2024-11-07 PROCEDURE — 87086 URINE CULTURE/COLONY COUNT: CPT | Performed by: FAMILY MEDICINE

## 2024-11-07 PROCEDURE — 81001 URINALYSIS AUTO W/SCOPE: CPT | Performed by: FAMILY MEDICINE

## 2024-11-07 PROCEDURE — 87591 N.GONORRHOEAE DNA AMP PROB: CPT | Performed by: FAMILY MEDICINE

## 2024-11-07 PROCEDURE — 99204 OFFICE O/P NEW MOD 45 MIN: CPT | Performed by: FAMILY MEDICINE

## 2024-11-07 PROCEDURE — 87491 CHLMYD TRACH DNA AMP PROBE: CPT | Performed by: FAMILY MEDICINE

## 2024-11-07 PROCEDURE — 87210 SMEAR WET MOUNT SALINE/INK: CPT | Performed by: FAMILY MEDICINE

## 2024-11-07 RX ORDER — METRONIDAZOLE 7.5 MG/G
1 GEL VAGINAL DAILY
Qty: 35 G | Refills: 0 | Status: SHIPPED | OUTPATIENT
Start: 2024-11-07 | End: 2024-11-14

## 2024-11-07 RX ORDER — CEPHALEXIN 500 MG/1
500 CAPSULE ORAL 2 TIMES DAILY
COMMUNITY
Start: 2024-11-07

## 2024-11-07 RX ORDER — CEPHALEXIN 500 MG/1
500 CAPSULE ORAL 2 TIMES DAILY
Qty: 10 CAPSULE | Refills: 0 | Status: SHIPPED | OUTPATIENT
Start: 2024-11-07 | End: 2024-11-12

## 2024-11-08 LAB
C TRACH DNA SPEC QL PROBE+SIG AMP: NEGATIVE
N GONORRHOEA DNA SPEC QL NAA+PROBE: NEGATIVE

## 2024-11-08 NOTE — PATIENT INSTRUCTIONS
Apply antibiotic ointment for BV   And complete the oral antibiotic for bladder infection   push more fluids

## 2024-11-08 NOTE — PROGRESS NOTES
Chief Complaint   Patient presents with    Urgent Care    UTI     Pt reports odorous urine, notice blood in urine, vaginal itching and pain with urination onset 7 days ago  Pt also report itchy rectum onset 3 days        Anne was seen today for urgent care and uti.    Diagnoses and all orders for this visit:    Dysuria  -     UA Macroscopic with reflex to Microscopic and Culture - Clinic Collect  -     Urine Microscopic Exam  -     Urine Culture    Vaginal itching  -     Wet prep - Clinic Collect    Screen for STD (sexually transmitted disease)  -     Chlamydia & Gonorrhea by PCR, GICH/Range - Clinic Collect    BV (bacterial vaginosis)  -     metroNIDAZOLE (METROGEL) 0.75 % vaginal gel; Place 1 applicator (5 g) vaginally daily for 7 days.    Anal itching  -     mebendazole (VERMOX) 100 MG chewable tablet; Take one tablet once Then repeat in 3 weeks    did spent>35 minutes with patient and > 50% of the time was for answering questions, discussing findings, counseling and coordination of care       D/d- vaginitis ,uti,urethritis ,std , pinworm infection, hemorrhoid, anal fissure    PLAN: Treatment per orders - also push fluids, may use Pyridium OTC prn. Call or return to clinic prn if these symptoms worsen or fail to improve as anticipated.  She is currently breast-feeding it is safe for her to use Keflex while breast-feeding.  Also use Bactrim to help with the BV  Reviewed results with patient in details.  As patient is breast-feeding discussed with patient that with the treatment for pinworm infection she cannot breast-feed patient wants to try the mebendazole she will pump and dump the breastmilk while being on it it is of 1 dose medication which she will repeat another time in 3 weeks time.  If symptoms do not get better she should follow-up.    SUBJECTIVE: Anne Alonso is a 34 year old female currently breast feeding  is here with chief complain of urinary frequency, urgency and dysuria x 3 days, without flank  pain, fever, chills, or abnormal vaginal discharge or bleeding.   She also mention about noticing some anal itching denies any hemorrhoid or any increasing straining with urination denies noticing any worm.  She has been noticing extreme anal itching which is bothering her .    OBJECTIVE: Appears well, in no apparent distress.  Vital signs are normal. The abdomen is soft without tenderness, guarding, mass, rebound or organomegaly. No CVA tenderness or inguinal adenopathy noted. Urine   Results for orders placed or performed in visit on 11/07/24   UA Macroscopic with reflex to Microscopic and Culture - Clinic Collect     Status: Abnormal    Specimen: Urine, Clean Catch   Result Value Ref Range    Color Urine Yellow Colorless, Straw, Light Yellow, Yellow    Appearance Urine Slightly Cloudy (A) Clear    Glucose Urine Negative Negative mg/dL    Bilirubin Urine Negative Negative    Ketones Urine Trace (A) Negative mg/dL    Specific Gravity Urine 1.025 1.003 - 1.035    Blood Urine Large (A) Negative    pH Urine 7.0 5.0 - 7.0    Protein Albumin Urine >=300 (A) Negative mg/dL    Urobilinogen Urine 2.0 (A) 0.2, 1.0 E.U./dL    Nitrite Urine Positive (A) Negative    Leukocyte Esterase Urine Moderate (A) Negative   Urine Microscopic Exam     Status: Abnormal   Result Value Ref Range    Bacteria Urine Few (A) None Seen /HPF    RBC Urine  (A) 0-2 /HPF /HPF    WBC Urine 10-25 (A) 0-5 /HPF /HPF    Squamous Epithelials Urine Moderate (A) None Seen /LPF   Wet prep - Clinic Collect     Status: Abnormal    Specimen: Vagina; Swab   Result Value Ref Range    Trichomonas Absent Absent    Yeast Absent Absent    Clue Cells Present (A) Absent    WBCs/high power field 2+ (A) None     Nai Griggs MD

## 2024-11-08 NOTE — TELEPHONE ENCOUNTER
Medication Question or Refill        What medication are you calling about (include dose and sig)?: Got 3 medications prescribed and only one is at the pharmacy. Pt did not know names of medications.    Preferred Pharmacy:   [id number]  [place], [place] - [address] AT [address] [place] [address] [address] [place] [address]  Phone: [phone] Fax: [phone]      Controlled Substance Agreement on file:   CSA -- Patient Level:    CSA: None found at the patient level.       Who prescribed the medication?: [place] KELLER     Do you need a refill? Yes, Has not used yet    When did you use the medication last? Not yet    Patient offered an appointment? No    Do you have any questions or concerns?  No      Could we send this information to you in [facility] or would you prefer to receive a phone call?:   Patient would prefer a phone call   Okay to leave a detailed message?: Yes at Cell number on file:    Telephone Information:   Mobile [phone]

## 2024-11-10 LAB
BACTERIA UR CULT: ABNORMAL
BACTERIA UR CULT: ABNORMAL

## 2025-03-16 ENCOUNTER — HEALTH MAINTENANCE LETTER (OUTPATIENT)
Age: 35
End: 2025-03-16

## (undated) DEVICE — SOL NACL 0.9% IRRIG 1000ML BOTTLE 07138-09

## (undated) DEVICE — DRSG TEGADERM 4X10" 1627

## (undated) DEVICE — SUCTION CANISTER MEDIVAC LINER 3000ML W/LID 65651-530

## (undated) DEVICE — BLADE CLIPPER 4406

## (undated) DEVICE — PREP CHLORAPREP 26ML TINTED ORANGE  260815

## (undated) DEVICE — GLOVE PROTEXIS BLUE W/NEU-THERA 7.0  2D73EB70

## (undated) DEVICE — PACK C-SECTION LF PL15OTA83B

## (undated) DEVICE — CATH TRAY FOLEY 16FR BARDEX W/DRAIN BAG STATLOCK 300316A

## (undated) DEVICE — GLOVE PROTEXIS W/NEU-THERA 7.0  2D73TE70

## (undated) DEVICE — SU VICRYL 0 CT-1 27" J340H

## (undated) DEVICE — SU MONOCRYL 0 CTX 36" Y398H

## (undated) DEVICE — LINEN C-SECTION 5415

## (undated) DEVICE — ESU GROUND PAD UNIVERSAL W/O CORD

## (undated) RX ORDER — MORPHINE SULFATE 1 MG/ML
INJECTION, SOLUTION EPIDURAL; INTRATHECAL; INTRAVENOUS
Status: DISPENSED
Start: 2023-02-09

## (undated) RX ORDER — ONDANSETRON 2 MG/ML
INJECTION INTRAMUSCULAR; INTRAVENOUS
Status: DISPENSED
Start: 2024-07-18

## (undated) RX ORDER — KETOROLAC TROMETHAMINE 30 MG/ML
INJECTION, SOLUTION INTRAMUSCULAR; INTRAVENOUS
Status: DISPENSED
Start: 2023-02-09

## (undated) RX ORDER — ONDANSETRON 2 MG/ML
INJECTION INTRAMUSCULAR; INTRAVENOUS
Status: DISPENSED
Start: 2023-02-09

## (undated) RX ORDER — OXYTOCIN/0.9 % SODIUM CHLORIDE 30/500 ML
PLASTIC BAG, INJECTION (ML) INTRAVENOUS
Status: DISPENSED
Start: 2023-02-09

## (undated) RX ORDER — MORPHINE SULFATE 1 MG/ML
INJECTION, SOLUTION EPIDURAL; INTRATHECAL; INTRAVENOUS
Status: DISPENSED
Start: 2024-07-18